# Patient Record
Sex: MALE | Employment: FULL TIME | URBAN - METROPOLITAN AREA
[De-identification: names, ages, dates, MRNs, and addresses within clinical notes are randomized per-mention and may not be internally consistent; named-entity substitution may affect disease eponyms.]

---

## 2022-05-02 ENCOUNTER — OFFICE VISIT (OUTPATIENT)
Dept: FAMILY MEDICINE CLINIC | Facility: CLINIC | Age: 42
End: 2022-05-02
Payer: COMMERCIAL

## 2022-05-02 VITALS
BODY MASS INDEX: 31.64 KG/M2 | DIASTOLIC BLOOD PRESSURE: 75 MMHG | HEART RATE: 52 BPM | WEIGHT: 221 LBS | HEIGHT: 70 IN | SYSTOLIC BLOOD PRESSURE: 134 MMHG

## 2022-05-02 DIAGNOSIS — Z00.00 ROUTINE PHYSICAL EXAMINATION: Primary | ICD-10-CM

## 2022-05-02 DIAGNOSIS — N46.9 MALE INFERTILITY: ICD-10-CM

## 2022-05-02 DIAGNOSIS — E80.6 HYPERBILIRUBINEMIA: ICD-10-CM

## 2022-05-02 DIAGNOSIS — K92.1 HEMATOCHEZIA: ICD-10-CM

## 2022-05-02 DIAGNOSIS — G56.03 BILATERAL CARPAL TUNNEL SYNDROME: ICD-10-CM

## 2022-05-02 PROCEDURE — 3008F BODY MASS INDEX DOCD: CPT | Performed by: FAMILY MEDICINE

## 2022-05-02 PROCEDURE — 3078F DIAST BP <80 MM HG: CPT | Performed by: FAMILY MEDICINE

## 2022-05-02 PROCEDURE — 3075F SYST BP GE 130 - 139MM HG: CPT | Performed by: FAMILY MEDICINE

## 2022-05-02 PROCEDURE — 99385 PREV VISIT NEW AGE 18-39: CPT | Performed by: FAMILY MEDICINE

## 2022-05-02 RX ORDER — HYDROCODONE BITARTRATE AND ACETAMINOPHEN 5; 325 MG/1; MG/1
TABLET ORAL
COMMUNITY

## 2022-05-02 RX ORDER — CLINDAMYCIN HYDROCHLORIDE 300 MG/1
CAPSULE ORAL
COMMUNITY

## 2022-05-02 RX ORDER — KETOROLAC TROMETHAMINE 10 MG/1
TABLET, FILM COATED ORAL
COMMUNITY

## 2022-05-06 ENCOUNTER — LAB ENCOUNTER (OUTPATIENT)
Dept: LAB | Age: 42
End: 2022-05-06
Attending: FAMILY MEDICINE
Payer: COMMERCIAL

## 2022-05-06 ENCOUNTER — PATIENT MESSAGE (OUTPATIENT)
Dept: FAMILY MEDICINE CLINIC | Facility: CLINIC | Age: 42
End: 2022-05-06

## 2022-05-06 ENCOUNTER — LAB ENCOUNTER (OUTPATIENT)
Dept: LAB | Facility: HOSPITAL | Age: 42
End: 2022-05-06
Attending: FAMILY MEDICINE
Payer: COMMERCIAL

## 2022-05-06 DIAGNOSIS — E80.6 HYPERBILIRUBINEMIA: ICD-10-CM

## 2022-05-06 DIAGNOSIS — N46.9 MALE INFERTILITY: ICD-10-CM

## 2022-05-06 DIAGNOSIS — Z00.00 ROUTINE PHYSICAL EXAMINATION: ICD-10-CM

## 2022-05-06 LAB
ALBUMIN SERPL-MCNC: 4.4 G/DL (ref 3.4–5)
ALP LIVER SERPL-CCNC: 67 U/L
ALT SERPL-CCNC: 36 U/L
AST SERPL-CCNC: 26 U/L (ref 15–37)
BILIRUB DIRECT SERPL-MCNC: 0.4 MG/DL (ref 0–0.2)
BILIRUB SERPL-MCNC: 1.8 MG/DL (ref 0.1–2)
CHOLEST SERPL-MCNC: 206 MG/DL (ref ?–200)
FASTING PATIENT GLUCOSE ANSWER: YES
FASTING PATIENT LIPID ANSWER: YES
GLUCOSE BLD-MCNC: 93 MG/DL (ref 70–99)
HDLC SERPL-MCNC: 63 MG/DL (ref 40–59)
LDLC SERPL CALC-MCNC: 129 MG/DL (ref ?–100)
NONHDLC SERPL-MCNC: 143 MG/DL (ref ?–130)
PH SMN: 7.6 [PH] (ref 7.2–8.3)
PROT SERPL-MCNC: 7.6 G/DL (ref 6.4–8.2)
SPECIMEN VOL SMN: 3.5 ML (ref 1.5–6)
SPERM # SMN: 199.6 MILL/ML (ref 15–150)
SPERM IMMOTILE NFR SMN: 5 %
SPERM IMMOTILE NFR SMN: 51 %
SPERM PROG NFR SMN: 44 %
TRIGL SERPL-MCNC: 76 MG/DL (ref 30–149)
TSI SER-ACNC: 1.49 MIU/ML (ref 0.36–3.74)
VLDLC SERPL CALC-MCNC: 14 MG/DL (ref 0–30)

## 2022-05-06 PROCEDURE — 84443 ASSAY THYROID STIM HORMONE: CPT

## 2022-05-06 PROCEDURE — 82947 ASSAY GLUCOSE BLOOD QUANT: CPT

## 2022-05-06 PROCEDURE — 36415 COLL VENOUS BLD VENIPUNCTURE: CPT

## 2022-05-06 PROCEDURE — 80061 LIPID PANEL: CPT

## 2022-05-06 PROCEDURE — 80076 HEPATIC FUNCTION PANEL: CPT

## 2022-05-06 PROCEDURE — 89320 SEMEN ANAL VOL/COUNT/MOT: CPT

## 2022-05-06 NOTE — TELEPHONE ENCOUNTER
From: Tanya Eller. Camilla Day DO  To: Katy Baeza  Sent: 5/6/2022 4:09 PM CDT  Subject: Results          Cholesterol elevated weight loss advised. Semen analysis looks good.

## 2022-07-18 ENCOUNTER — OFFICE VISIT (OUTPATIENT)
Dept: SURGERY | Facility: CLINIC | Age: 42
End: 2022-07-18
Payer: COMMERCIAL

## 2022-07-18 VITALS — HEART RATE: 111 BPM | DIASTOLIC BLOOD PRESSURE: 67 MMHG | SYSTOLIC BLOOD PRESSURE: 103 MMHG

## 2022-07-18 DIAGNOSIS — N46.9 MALE INFERTILITY: Primary | ICD-10-CM

## 2022-08-05 ENCOUNTER — LAB ENCOUNTER (OUTPATIENT)
Dept: LAB | Facility: HOSPITAL | Age: 42
End: 2022-08-05
Attending: UROLOGY
Payer: COMMERCIAL

## 2022-08-05 DIAGNOSIS — N46.9 MALE INFERTILITY: ICD-10-CM

## 2022-08-05 LAB
PH SMN: 7.2 [PH] (ref 7.2–8.3)
SPECIMEN VOL SMN: 4 ML (ref 1.5–6)
SPERM # SMN: 118.6 MILL/ML (ref 15–150)
SPERM IMMOTILE NFR SMN: 48 %
SPERM IMMOTILE NFR SMN: 6 %
SPERM PROG NFR SMN: 46 %
VISC SMN QL: NORMAL

## 2022-08-05 PROCEDURE — 89320 SEMEN ANAL VOL/COUNT/MOT: CPT

## 2024-04-28 ENCOUNTER — APPOINTMENT (OUTPATIENT)
Dept: GENERAL RADIOLOGY | Facility: HOSPITAL | Age: 44
End: 2024-04-28
Attending: EMERGENCY MEDICINE
Payer: COMMERCIAL

## 2024-04-28 ENCOUNTER — HOSPITAL ENCOUNTER (EMERGENCY)
Facility: HOSPITAL | Age: 44
Discharge: HOME | End: 2024-04-28
Attending: EMERGENCY MEDICINE
Payer: COMMERCIAL

## 2024-04-28 VITALS
DIASTOLIC BLOOD PRESSURE: 77 MMHG | OXYGEN SATURATION: 95 % | BODY MASS INDEX: 30.78 KG/M2 | WEIGHT: 215 LBS | HEART RATE: 66 BPM | HEIGHT: 70 IN | SYSTOLIC BLOOD PRESSURE: 96 MMHG | RESPIRATION RATE: 22 BRPM | TEMPERATURE: 98 F

## 2024-04-28 DIAGNOSIS — R00.1 BRADYCARDIA: ICD-10-CM

## 2024-04-28 DIAGNOSIS — R94.31 ABNORMAL EKG: ICD-10-CM

## 2024-04-28 DIAGNOSIS — R07.9 CHEST PAIN, UNSPECIFIED TYPE: Primary | ICD-10-CM

## 2024-04-28 LAB
ANION GAP SERPL CALC-SCNC: 10 MMOL/L (ref 0–18)
ATRIAL RATE: 79 BPM
BASOPHILS # BLD AUTO: 0.04 X10(3) UL (ref 0–0.2)
BASOPHILS NFR BLD AUTO: 0.6 %
BUN BLD-MCNC: 16 MG/DL (ref 9–23)
BUN/CREAT SERPL: 16.8 (ref 10–20)
CALCIUM BLD-MCNC: 10.3 MG/DL (ref 8.7–10.4)
CHLORIDE SERPL-SCNC: 107 MMOL/L (ref 98–112)
CO2 SERPL-SCNC: 23 MMOL/L (ref 21–32)
CREAT BLD-MCNC: 0.95 MG/DL
DEPRECATED RDW RBC AUTO: 40.4 FL (ref 35.1–46.3)
EGFRCR SERPLBLD CKD-EPI 2021: 103 ML/MIN/1.73M2 (ref 60–?)
EOSINOPHIL # BLD AUTO: 0.05 X10(3) UL (ref 0–0.7)
EOSINOPHIL NFR BLD AUTO: 0.8 %
ERYTHROCYTE [DISTWIDTH] IN BLOOD BY AUTOMATED COUNT: 12.2 % (ref 11–15)
GLUCOSE BLD-MCNC: 120 MG/DL (ref 70–99)
GLUCOSE BLDC GLUCOMTR-MCNC: 107 MG/DL (ref 70–99)
HCT VFR BLD AUTO: 47.9 %
HGB BLD-MCNC: 16.8 G/DL
IMM GRANULOCYTES # BLD AUTO: 0.04 X10(3) UL (ref 0–1)
IMM GRANULOCYTES NFR BLD: 0.6 %
LYMPHOCYTES # BLD AUTO: 2.7 X10(3) UL (ref 1–4)
LYMPHOCYTES NFR BLD AUTO: 42.3 %
MAGNESIUM SERPL-MCNC: 1.4 MG/DL (ref 1.6–2.6)
MCH RBC QN AUTO: 31.7 PG (ref 26–34)
MCHC RBC AUTO-ENTMCNC: 35.1 G/DL (ref 31–37)
MCV RBC AUTO: 90.4 FL
MONOCYTES # BLD AUTO: 0.36 X10(3) UL (ref 0.1–1)
MONOCYTES NFR BLD AUTO: 5.6 %
NEUTROPHILS # BLD AUTO: 3.19 X10 (3) UL (ref 1.5–7.7)
NEUTROPHILS # BLD AUTO: 3.19 X10(3) UL (ref 1.5–7.7)
NEUTROPHILS NFR BLD AUTO: 50.1 %
OSMOLALITY SERPL CALC.SUM OF ELEC: 292 MOSM/KG (ref 275–295)
P AXIS: 70 DEGREES
P-R INTERVAL: 166 MS
PLATELET # BLD AUTO: 212 10(3)UL (ref 150–450)
POTASSIUM SERPL-SCNC: 3.8 MMOL/L (ref 3.5–5.1)
Q-T INTERVAL: 388 MS
QRS DURATION: 80 MS
QTC CALCULATION (BEZET): 444 MS
R AXIS: 65 DEGREES
RBC # BLD AUTO: 5.3 X10(6)UL
SODIUM SERPL-SCNC: 140 MMOL/L (ref 136–145)
T AXIS: 43 DEGREES
TROPONIN I SERPL HS-MCNC: 3 NG/L
VENTRICULAR RATE: 79 BPM
WBC # BLD AUTO: 6.4 X10(3) UL (ref 4–11)

## 2024-04-28 PROCEDURE — 71046 X-RAY EXAM CHEST 2 VIEWS: CPT | Performed by: EMERGENCY MEDICINE

## 2024-04-28 PROCEDURE — 82962 GLUCOSE BLOOD TEST: CPT

## 2024-04-28 PROCEDURE — 93010 ELECTROCARDIOGRAM REPORT: CPT

## 2024-04-28 PROCEDURE — 99285 EMERGENCY DEPT VISIT HI MDM: CPT

## 2024-04-28 PROCEDURE — 85025 COMPLETE CBC W/AUTO DIFF WBC: CPT | Performed by: EMERGENCY MEDICINE

## 2024-04-28 PROCEDURE — 83735 ASSAY OF MAGNESIUM: CPT | Performed by: EMERGENCY MEDICINE

## 2024-04-28 PROCEDURE — 96361 HYDRATE IV INFUSION ADD-ON: CPT

## 2024-04-28 PROCEDURE — 84484 ASSAY OF TROPONIN QUANT: CPT | Performed by: EMERGENCY MEDICINE

## 2024-04-28 PROCEDURE — 80048 BASIC METABOLIC PNL TOTAL CA: CPT | Performed by: EMERGENCY MEDICINE

## 2024-04-28 PROCEDURE — 84443 ASSAY THYROID STIM HORMONE: CPT

## 2024-04-28 PROCEDURE — 93005 ELECTROCARDIOGRAM TRACING: CPT

## 2024-04-28 PROCEDURE — 80061 LIPID PANEL: CPT

## 2024-04-28 PROCEDURE — 96365 THER/PROPH/DIAG IV INF INIT: CPT

## 2024-04-28 RX ORDER — MAGNESIUM SULFATE 1 G/100ML
1 INJECTION INTRAVENOUS ONCE
Status: COMPLETED | OUTPATIENT
Start: 2024-04-28 | End: 2024-04-28

## 2024-04-28 NOTE — ED PROVIDER NOTES
Patient Seen in: [facility]         EMERGENCY DEPARTMENT NOTE    Dictated. Voice Transcription software has been utilized for this dictation (the reader should be aware that typographical errors are possible with voice transcription software and to please contact the dictating physician if there are questions.)         History     Chief Complaint   Patient presents with    Numbness    Anxiety/Panic attack              There may be discrepancies from triage note.     HPI    History provided by patient and patient's wife.  43-year-old male who denies any medical problems complaining of 4 years of intermittent low heart rate noted to be in the 30s.  States that he noted his heart rate in the 30s secondary to the use of the Apple Watch.  Has not followed with primary/cardiology regarding the aforementioned.  States that he had an isolated episode of chest pain on Friday which self subsided.  Has not had recurrent chest pain or shortness of breath since then.  Today is Sunday.  Reports great exercise tolerance.  Reports working out almost daily.  Patient comes to the emergency department today because he had an isolated episode of chest pain and  noted his heart rate to be in the 30s once again  in the last 24 hours.  Reports feeling anxious about the aforementioned.   and asking him about the triage note of total body numbness.  Wife states that patient was hyperventilating in the car secondary to feeling nervous.  Patient admits to the aforementioned.  Currently denies any diffuse body paresthesias.    No tobacco, no drugs.  Patient is adopted,  does not know history of sudden cardiac death      No fevers, chills, nausea, vomiting, diarrhea, constipation, cough, cold symptoms, urinary complaints.  No pleuritic pain, no recent travel.  Age < 50, HR< 100, o2 saturation  in room greater than 95%, no previous history of venous thromboembolism, no trauma/surgery within the last 4 weeks, no hemoptysis, no exogenous  estrogen, no unilateral leg swellling. Perc score of 0  No headache, neck pain, neck stiffness, incontinence.  No changes in mentation, no changes in vision, no total/new extremity weakness, no total/new extremity paresthesia, no difficulty speaking.  No alleviating or exacerbating factors.  Denies orthopnea, pnd, change in exercise tolerance limited by chest pain/sob , lower extremity edema/asymmetry.         History reviewed. History reviewed. No pertinent past medical history.    History reviewed.   Past Surgical History:   Procedure Laterality Date    Knee surgery      left knee ACL/MCL/MENISCUS    Repair biceps long tendon      Repair of nasal septum           Medications :  (Not in a hospital admission)       Family History   Family history unknown: Yes       Smoking Status:   Social History     Socioeconomic History    Marital status:    Tobacco Use    Smoking status: Never    Smokeless tobacco: Never   Vaping Use    Vaping status: Never Used   Substance and Sexual Activity    Alcohol use: Yes     Comment: socially    Drug use: Not Currently       Review of Systems   Constitutional: Negative.    HENT: Negative.     Eyes: Negative.    Respiratory: Negative.     Cardiovascular: Negative.    Gastrointestinal: Negative.    Genitourinary: Negative.    Musculoskeletal: Negative.    Skin: Negative.    Neurological: Negative.    Endo/Heme/Allergies: Negative.    Psychiatric/Behavioral: Negative.     All other systems reviewed and are negative.    Pertinent positives as listed.  All other organ systems are reviewed and are negative.    Constitutional and vital signs reviewed.      Social History and Family History elements reviewed from today, pertinent positives to the presenting problem noted.    Physical Exam     ED Triage Vitals [04/28/24 1251]   /68   Pulse 79   Resp (!) 30   Temp 98.3 °F (36.8 °C)   Temp src Temporal   SpO2 98 %   O2 Device None (Room air)       All measures to prevent infection  transmission during my interaction with the patient were taken. The patient was already wearing a droplet mask on my arrival to the room. Personal protective equipment including droplet mask, eye protection, and gloves were worn throughout the duration of the exam.  Handwashing was performed prior to and after the exam.  Stethoscope and any equipment used during my examination was cleaned with super sani-cloth germicidal wipes following the exam.     Physical Exam  Vitals and nursing note reviewed.   Constitutional:       General: He is not in acute distress.     Appearance: He is not ill-appearing or toxic-appearing.   HENT:      Head: Normocephalic and atraumatic.   Neck:      Vascular: No carotid bruit.   Cardiovascular:      Rate and Rhythm: Normal rate and regular rhythm.      Comments:   Dorsalis pedis pulses 2+ bilaterally    Pulmonary:      Effort: Pulmonary effort is normal. No respiratory distress.      Breath sounds: No stridor. No wheezing, rhonchi or rales.   Chest:      Chest wall: No tenderness.   Abdominal:      General: There is no distension.      Palpations: Abdomen is soft.      Tenderness: There is no abdominal tenderness. There is no guarding or rebound.   Musculoskeletal:      Cervical back: Normal range of motion and neck supple. No tenderness.      Right lower leg: No edema.      Left lower leg: No edema.   Skin:     Capillary Refill: Capillary refill takes less than 2 seconds.      Coloration: Skin is not jaundiced or pale.      Findings: No bruising, erythema, lesion or rash.   Neurological:      Mental Status: He is alert and oriented to person, place, and time.      Sensory: No sensory deficit.      Motor: No weakness.      Comments: Gross motor and sensory function intact symmetrically and bilaterally to upper extremities and lower extremities.   Psychiatric:      Comments: Anxious           Review of prior notes in Care everywhere/Epic performed by myself:  -No recent ER visits  ED  Course     If labs obtained, they are personally reviewed by myself:     Labs Reviewed   BASIC METABOLIC PANEL (8) - Abnormal; Notable for the following components:       Result Value    Glucose 120 (*)     All other components within normal limits   MAGNESIUM - Abnormal; Notable for the following components:    Magnesium 1.4 (*)     All other components within normal limits   POCT GLUCOSE - Abnormal; Notable for the following components:    POC Glucose  107 (*)     All other components within normal limits   TROPONIN I HIGH SENSITIVITY - Normal   CBC WITH DIFFERENTIAL WITH PLATELET    Narrative:     The following orders were created for panel order CBC With Differential With Platelet.                  Procedure                               Abnormality         Status                                     ---------                               -----------         ------                                     CBC W/ DIFFERENTIAL[[id number]]                              Final result                                                 Please view results for these tests on the individual orders.   CBC W/ DIFFERENTIAL       If radiologic studies ordered during today's ER visit, my independent interpretation are seen directly below.  This is awaiting the radiologist's final interpretation.  Chest X-ray, independent interpretation completed by myself and awaiting formal radiology read:  No acute cardiopulmonary process, no obvious mass       Imaging Results read by radiology in ED: XR CHEST PA + LAT CHEST (CPT=71046)    Result Date: 4/28/2024  CONCLUSION: No acute cardiopulmonary disease.    Dictated by (CST): Marcus Das MD on 4/28/2024 at 2:04 PM     Finalized by (CST): Marcus Das MD on 4/28/2024 at 2:05 PM               ED Medications Administered:   Medications   magnesium sulfate in dextrose 5% 1 g/100mL infusion premix 1 g (1 g Intravenous New Bag 4/28/24 1423)   sodium chloride 0.9 % IV bolus 1,000 mL (1,000 mL  Intravenous New Bag 4/28/24 1326)           Vitals:    04/28/24 1336 04/28/24 1351 04/28/24 1406 04/28/24 1421   BP:  121/72 125/64 123/72   Pulse: 65 64 64 72   Resp: 11 12 16    Temp:       TempSrc:       SpO2: 99% 97% 96% 93%   Weight:       Height:         *I personally reviewed and interpreted all ED vitals.    Pulse Ox interpretation by myself: 98%, Room air, Normal     Monitor Interpretation by myself:   normal sinus rhythm    If Ekg obtained during today's visit, it is independently interpreted by myself directly below:  EKG    Rate, intervals and axes as noted on EKG Report.  Rate: 79  Rhythm: Sinus Rhythm  Reading: no st elevation, no st depression, normal t waves   Sinus arrhythmia             Medical Record Review: I personally reviewed available prior medical records for any recent pertinent discharge summaries, testing, and procedures and reviewed those reports.      [facility]     Medical decision making/ED Course:   43-year-old male who exercises regularly and has a an athletic physique, complaining of heart rates dipping down to the 30s noted on his Apple Watch over the course of years.  States that this reoccurred x 1 over the course of 24 hours.  No current chest pain, shortness of breath, patient had an isolated episode of chest pain on Friday.  Today is Sunday.  Extremely anxious during interview.  Unclear etiology of patient's bradycardia however patient was observed here in the emergency department with no episodes of sinus bradycardia/dysrhythmia.  Troponin negative, EKG sinus-if this were true ACS, would anticipate a troponin elevation given duration of symptoms and the use of the high-sensitivity troponin.  Chest x-ray with normal-sized mediastinum, normal cardiac size.  Patient with no exertional symptoms to suggest acute decompensated CHF.  Electrolytes essentially normal sodium, potassium, chloride normal.  Hemoglobin normal.  Magnesium level normal.  Magnesium slightly low at 1.4, patient  given 1 g of magnesium.  Patient encouraged to follow with cardiology as an outpatient.  Encouraged to return to the emergency department for any worsening symptoms.  No pleuritic pain, hypoxia, lower extremity asymmetry distress PE.  Patient's Accu-Chek upon arrival was normal.  No episodes of bradycardia here in the emergency department.  Chest x-ray normal.  Patient encouraged to follow with cardiology as an outpatient.  He will likely benefit from Zio patch.  Patient is very clear to mention that his heart rate has dropped to the 30s for several years.  I doubt an acute life-threatening pathology.  Denies lightheadedness at this time  ACS, dissection, PE, cardiac arrhythmia, pericardial effusion, CHF, among other life-threatening medical conditions considered and seems unlikely given patient's history, exam, and appearance.  Strict return instructions given.  Patient encouraged to follow-up with primary care provider in the next few days.  Advised to return to the emergency department for any worsening symptoms      Patient is non toxic appearing, is in no distress, hemodynamically stable.  Pt agrees and is aware of plan.   Patient is comfortable with discharge.      Differential Diagnosis:  as listed above in medical decision making.   *Please note that in the presenting to the emergency department, illness/injury that poses a threat to life or function is considered during this patient's initial evaluation.    The complexity of this visit is therefore inherently more complex given the need to consider life threatening pathology prior to any other etiology for this patient's visit.    The differential diagnosis and medical decision above exemplify this rationale.       Medical Decision Making  Problems Addressed:  Chest pain, unspecified type: acute illness or injury    Amount and/or Complexity of Data Reviewed  Independent Historian: spouse  External Data Reviewed: notes.  Labs: ordered. Decision-making  details documented in ED Course.  Radiology: ordered and independent interpretation performed. Decision-making details documented in ED Course.  ECG/medicine tests: ordered and independent interpretation performed. Decision-making details documented in ED Course.               Vitals:    04/28/24 1336 04/28/24 1351 04/28/24 1406 04/28/24 1421   BP:  121/72 125/64 123/72   Pulse: 65 64 64 72   Resp: 11 12 16    Temp:       TempSrc:       SpO2: 99% 97% 96% 93%   Weight:       Height:                 Complicating Factors: Significant medical problems that contribute to the complexity of this emergency room evaluation is listed above.    Condition upon leaving the department: Stable    Disposition and Plan     Clinical Impression:  1. Chest pain, unspecified type        Disposition:  Discharge    Medications Prescribed:  Current Discharge Medication List          I have discussed the discharge plan with the patient and/or family or well wisher present in the room with the patient's permission.  They state that they understand and agree with the plan.  All questions regarding their care have been answered prior to discharge.  They are aware: Emergency Department is not intended to be a substitute for an effort to provide complete medical care. The imaging, if any, have often been interpreted on a preliminary basis pending final reading by the radiologist.  Instructed to return immediately to the ED if any changes or worsening of condition should occur.  If patient's blood pressure was greater than 140/90 today, patient encouraged to have this blood pressure rechecked with primary MD and blood pressure education provided.

## 2024-04-28 NOTE — ED INITIAL ASSESSMENT (HPI)
Pt ambulatory to ED A&O x 4 w/ c/o: \"I think I'm having a heart attack.  I am numb everywhere.\"  Pt denies any current chest pain.  Pt reporting he's had instances where his HR drops to the \"30s.\"  Pt reporting he checked his Apple watch and it told him his HR was in the \"30s.\"  Pt arrives extremely anxious, hyperventilating, restless.

## 2024-04-28 NOTE — DISCHARGE INSTRUCTIONS
Return to emergency department for any worsening symptoms including but not limited to worsening palpitations, chest pain, shortness of breath, worsening symptoms with exertion, lower extremity swelling, weakness, numbness, abdominal pain, etc. Please follow with your primary care provider in the next few days for reevaluation of your symptoms.  Please follow with cardiology regarding your palpitations.  You may benefit from a Zio patch/Holter monitor to evaluate your cardiac rhythm.      The Emergency Department is not intended to be a substitute for an effort to provide complete medical care. The imaging, if any, have often been interpreted on a preliminary basis pending final reading by the radiologist. If your blood pressure was greater than 140/90, please have this blood pressure rechecked by your primary care provider in the next several days.

## 2024-05-02 ENCOUNTER — OFFICE VISIT (OUTPATIENT)
Dept: FAMILY MEDICINE CLINIC | Facility: CLINIC | Age: 44
End: 2024-05-02
Payer: COMMERCIAL

## 2024-05-02 ENCOUNTER — EKG ENCOUNTER (OUTPATIENT)
Dept: LAB | Age: 44
End: 2024-05-02
Attending: FAMILY MEDICINE
Payer: COMMERCIAL

## 2024-05-02 ENCOUNTER — HOSPITAL ENCOUNTER (OUTPATIENT)
Dept: ULTRASOUND IMAGING | Age: 44
Discharge: HOME | End: 2024-05-02
Attending: FAMILY MEDICINE
Payer: COMMERCIAL

## 2024-05-02 ENCOUNTER — LAB ENCOUNTER (OUTPATIENT)
Dept: LAB | Age: 44
End: 2024-05-02
Attending: FAMILY MEDICINE
Payer: COMMERCIAL

## 2024-05-02 ENCOUNTER — PATIENT OUTREACH (OUTPATIENT)
Dept: CASE MANAGEMENT | Age: 44
End: 2024-05-02

## 2024-05-02 VITALS
DIASTOLIC BLOOD PRESSURE: 80 MMHG | HEART RATE: 51 BPM | WEIGHT: 215 LBS | SYSTOLIC BLOOD PRESSURE: 136 MMHG | BODY MASS INDEX: 30.78 KG/M2 | HEIGHT: 70 IN

## 2024-05-02 DIAGNOSIS — R00.1 BRADYCARDIA: ICD-10-CM

## 2024-05-02 DIAGNOSIS — R09.89 ABDOMINAL AORTIC PULSATION: ICD-10-CM

## 2024-05-02 DIAGNOSIS — R94.31 ABNORMAL EKG: ICD-10-CM

## 2024-05-02 DIAGNOSIS — R94.31 ABNORMAL EKG: Primary | ICD-10-CM

## 2024-05-02 LAB
ATRIAL RATE: 66 BPM
CHOLEST SERPL-MCNC: 212 MG/DL (ref ?–200)
HDLC SERPL-MCNC: 67 MG/DL (ref 40–59)
LDLC SERPL CALC-MCNC: 120 MG/DL (ref ?–100)
NONHDLC SERPL-MCNC: 145 MG/DL (ref ?–130)
P AXIS: 77 DEGREES
P-R INTERVAL: 138 MS
Q-T INTERVAL: 440 MS
QRS DURATION: 94 MS
QTC CALCULATION (BEZET): 461 MS
R AXIS: 47 DEGREES
T AXIS: 24 DEGREES
TRIGL SERPL-MCNC: 145 MG/DL (ref 30–149)
TSI SER-ACNC: 1.91 MIU/ML (ref 0.55–4.78)
VENTRICULAR RATE: 66 BPM
VLDLC SERPL CALC-MCNC: 25 MG/DL (ref 0–30)

## 2024-05-02 PROCEDURE — 3008F BODY MASS INDEX DOCD: CPT | Performed by: FAMILY MEDICINE

## 2024-05-02 PROCEDURE — 3075F SYST BP GE 130 - 139MM HG: CPT | Performed by: FAMILY MEDICINE

## 2024-05-02 PROCEDURE — 99214 OFFICE O/P EST MOD 30 MIN: CPT | Performed by: FAMILY MEDICINE

## 2024-05-02 PROCEDURE — 93010 ELECTROCARDIOGRAM REPORT: CPT | Performed by: INTERNAL MEDICINE

## 2024-05-02 PROCEDURE — 3079F DIAST BP 80-89 MM HG: CPT | Performed by: FAMILY MEDICINE

## 2024-05-02 PROCEDURE — 76770 US EXAM ABDO BACK WALL COMP: CPT | Performed by: FAMILY MEDICINE

## 2024-05-02 PROCEDURE — 93005 ELECTROCARDIOGRAM TRACING: CPT

## 2024-05-02 RX ORDER — ESCITALOPRAM OXALATE 10 MG/1
10 TABLET ORAL DAILY
Qty: 30 TABLET | Refills: 2 | Status: SHIPPED | OUTPATIENT
Start: 2024-05-02

## 2024-05-02 NOTE — PROGRESS NOTES
1st attempt ER f/up apt request  PCP -existing apt (5/2)  [facility] -decline, pt stated he will make own apt  Closing enocunter

## 2024-05-02 NOTE — PROGRESS NOTES
Blood pressure 136/80, pulse 51, height 5' 10\" (1.778 m), weight 215 lb (97.5 kg).          2-month history of noticing his heart rate monitor saying his pulses in his 30s while he is resting.  He had an episode of chest pain 4 days ago went to the emergency room.  At the time the pain was substernal dull ache radiating to the right shoulder and the back    Severity was 2-3/10.  He reports he was sitting when the pain began.  No history of syncope.  He did get difficulty breathing is that he was breaking out.  Cut out caffeine 2 weeks ago.  Denies chest pain or dyspnea on exertion.  He does 25 to 30 minutes of high intensive cardio work in the gym.  He will run 3 to 4 miles when it is warm out.  He is adopted and no known siblings.  He drinks about 8 drinks of alcohol weekly on the weekends.  No history of DUIs.  Denies daily medications Works as a teacher.  Denies melena/hematochezia or asthma.  In the emergency department he was told his pulse went down into the 40s.  He is also concerned about the pulsating aorta in his abdomen    He denies illicit drug use.  Denies any suicidal ideation.  Admits to being anxious.  Objective    Heart regular rate rhythm no murmurs    Neck supple no carotid bruits    Pulsating aorta noted abdomen no bruits    Abnormal EKG done in the emergency room.    Pulse in office is 51    Assessment #1 bradycardia #2 abnormal EKG #3 anxiety panic    4.  Pulsatile aorta    Plan #148-hour Holter monitor ordered repeat EKG and echo #2 stress echo ordered #3 start Lexapro follow-up in 3 weeks #4 abdominal ultrasound of aorta ordered

## 2024-05-11 ENCOUNTER — HOSPITAL ENCOUNTER (OUTPATIENT)
Dept: CV DIAGNOSTICS | Facility: HOSPITAL | Age: 44
Discharge: HOME | End: 2024-05-11
Attending: FAMILY MEDICINE

## 2024-05-11 DIAGNOSIS — R94.31 ABNORMAL EKG: ICD-10-CM

## 2024-05-11 DIAGNOSIS — R00.1 BRADYCARDIA: ICD-10-CM

## 2024-05-11 PROCEDURE — 93225 XTRNL ECG REC<48 HRS REC: CPT | Performed by: FAMILY MEDICINE

## 2024-05-11 PROCEDURE — 93350 STRESS TTE ONLY: CPT | Performed by: FAMILY MEDICINE

## 2024-05-11 PROCEDURE — 93306 TTE W/DOPPLER COMPLETE: CPT | Performed by: FAMILY MEDICINE

## 2024-05-11 PROCEDURE — 93226 XTRNL ECG REC<48 HR SCAN A/R: CPT | Performed by: FAMILY MEDICINE

## 2024-05-11 PROCEDURE — 93017 CV STRESS TEST TRACING ONLY: CPT | Performed by: FAMILY MEDICINE

## 2024-05-11 PROCEDURE — 93227 XTRNL ECG REC<48 HR R&I: CPT | Performed by: FAMILY MEDICINE

## 2024-05-11 PROCEDURE — 93018 CV STRESS TEST I&R ONLY: CPT | Performed by: FAMILY MEDICINE

## 2024-05-14 ENCOUNTER — TELEPHONE (OUTPATIENT)
Dept: FAMILY MEDICINE CLINIC | Facility: CLINIC | Age: 44
End: 2024-05-14

## 2024-05-14 NOTE — TELEPHONE ENCOUNTER
Called and spoke to STORM majano in  [facility] triage that patient would be coming by car. See previous message

## 2024-05-14 NOTE — TELEPHONE ENCOUNTER
Katherine with [facility] states patient's holter monitor showed a total of 87 runs of ventricular tachycardia.   The highest rate was 194.  Katherine will be faxing report to office for review however she states 194 is a critical value and was reporting finding.

## 2024-05-14 NOTE — PROGRESS NOTES
Critical findings of on 48 hour holter monitor report. 29, 3 beat runs of v tach. The fastest was 3 beats, 194 bpm @ 2:24 pm. Called  office and spoke with Debbi (STORM) and advised her of findings. Faxed a copy of holter report to dr office and put copy in [employer] reading bin.

## 2024-05-14 NOTE — TELEPHONE ENCOUNTER
Please advise patient that his heart has occasionally entered dangerous arrhythmias.  He needs to go to the emergency department immediately cardiology has been informed.

## 2024-05-15 ENCOUNTER — MED REC SCAN ONLY (OUTPATIENT)
Dept: FAMILY MEDICINE CLINIC | Facility: CLINIC | Age: 44
End: 2024-05-15

## 2024-05-16 ENCOUNTER — HOSPITAL ENCOUNTER (EMERGENCY)
Facility: HOSPITAL | Age: 44
Discharge: HOME | End: 2024-05-16
Attending: EMERGENCY MEDICINE

## 2024-05-16 ENCOUNTER — PATIENT MESSAGE (OUTPATIENT)
Dept: FAMILY MEDICINE CLINIC | Facility: CLINIC | Age: 44
End: 2024-05-16

## 2024-05-16 VITALS
HEART RATE: 54 BPM | WEIGHT: 214.94 LBS | TEMPERATURE: 98 F | DIASTOLIC BLOOD PRESSURE: 88 MMHG | SYSTOLIC BLOOD PRESSURE: 142 MMHG | OXYGEN SATURATION: 99 % | RESPIRATION RATE: 11 BRPM | BODY MASS INDEX: 31 KG/M2

## 2024-05-16 DIAGNOSIS — R94.31 ABNORMAL EKG: ICD-10-CM

## 2024-05-16 DIAGNOSIS — R00.1 BRADYCARDIA: ICD-10-CM

## 2024-05-16 DIAGNOSIS — I47.20 VENTRICULAR TACHYCARDIA (HCC): Primary | ICD-10-CM

## 2024-05-16 LAB
ANION GAP SERPL CALC-SCNC: 6 MMOL/L (ref 0–18)
BASOPHILS # BLD AUTO: 0.03 X10(3) UL (ref 0–0.2)
BASOPHILS NFR BLD AUTO: 0.5 %
BNP SERPL-MCNC: 11 PG/ML
BUN BLD-MCNC: 13 MG/DL (ref 9–23)
BUN/CREAT SERPL: 14.8 (ref 10–20)
CALCIUM BLD-MCNC: 9.1 MG/DL (ref 8.7–10.4)
CHLORIDE SERPL-SCNC: 105 MMOL/L (ref 98–112)
CO2 SERPL-SCNC: 28 MMOL/L (ref 21–32)
CREAT BLD-MCNC: 0.88 MG/DL
DEPRECATED RDW RBC AUTO: 39.8 FL (ref 35.1–46.3)
EGFRCR SERPLBLD CKD-EPI 2021: 111 ML/MIN/1.73M2 (ref 60–?)
EOSINOPHIL # BLD AUTO: 0.17 X10(3) UL (ref 0–0.7)
EOSINOPHIL NFR BLD AUTO: 3.1 %
ERYTHROCYTE [DISTWIDTH] IN BLOOD BY AUTOMATED COUNT: 12 % (ref 11–15)
GLUCOSE BLD-MCNC: 105 MG/DL (ref 70–99)
HCT VFR BLD AUTO: 47 %
HGB BLD-MCNC: 16.3 G/DL
IMM GRANULOCYTES # BLD AUTO: 0.01 X10(3) UL (ref 0–1)
IMM GRANULOCYTES NFR BLD: 0.2 %
LYMPHOCYTES # BLD AUTO: 2.14 X10(3) UL (ref 1–4)
LYMPHOCYTES NFR BLD AUTO: 39.1 %
MAGNESIUM SERPL-MCNC: 1.9 MG/DL (ref 1.6–2.6)
MCH RBC QN AUTO: 31.4 PG (ref 26–34)
MCHC RBC AUTO-ENTMCNC: 34.7 G/DL (ref 31–37)
MCV RBC AUTO: 90.6 FL
MONOCYTES # BLD AUTO: 0.47 X10(3) UL (ref 0.1–1)
MONOCYTES NFR BLD AUTO: 8.6 %
NEUTROPHILS # BLD AUTO: 2.66 X10 (3) UL (ref 1.5–7.7)
NEUTROPHILS # BLD AUTO: 2.66 X10(3) UL (ref 1.5–7.7)
NEUTROPHILS NFR BLD AUTO: 48.5 %
OSMOLALITY SERPL CALC.SUM OF ELEC: 288 MOSM/KG (ref 275–295)
PLATELET # BLD AUTO: 208 10(3)UL (ref 150–450)
POTASSIUM SERPL-SCNC: 3.8 MMOL/L (ref 3.5–5.1)
RBC # BLD AUTO: 5.19 X10(6)UL
SODIUM SERPL-SCNC: 139 MMOL/L (ref 136–145)
TROPONIN I SERPL HS-MCNC: 4 NG/L
TSI SER-ACNC: 1.9 MIU/ML (ref 0.55–4.78)
WBC # BLD AUTO: 5.5 X10(3) UL (ref 4–11)

## 2024-05-16 PROCEDURE — 85025 COMPLETE CBC W/AUTO DIFF WBC: CPT | Performed by: EMERGENCY MEDICINE

## 2024-05-16 PROCEDURE — 84484 ASSAY OF TROPONIN QUANT: CPT | Performed by: EMERGENCY MEDICINE

## 2024-05-16 PROCEDURE — 36415 COLL VENOUS BLD VENIPUNCTURE: CPT

## 2024-05-16 PROCEDURE — 99285 EMERGENCY DEPT VISIT HI MDM: CPT

## 2024-05-16 PROCEDURE — 93010 ELECTROCARDIOGRAM REPORT: CPT

## 2024-05-16 PROCEDURE — 83735 ASSAY OF MAGNESIUM: CPT | Performed by: EMERGENCY MEDICINE

## 2024-05-16 PROCEDURE — 83880 ASSAY OF NATRIURETIC PEPTIDE: CPT | Performed by: EMERGENCY MEDICINE

## 2024-05-16 PROCEDURE — 80048 BASIC METABOLIC PNL TOTAL CA: CPT | Performed by: EMERGENCY MEDICINE

## 2024-05-16 PROCEDURE — 84443 ASSAY THYROID STIM HORMONE: CPT | Performed by: EMERGENCY MEDICINE

## 2024-05-16 PROCEDURE — 99284 EMERGENCY DEPT VISIT MOD MDM: CPT

## 2024-05-16 PROCEDURE — 93005 ELECTROCARDIOGRAM TRACING: CPT

## 2024-05-16 NOTE — ED PROVIDER NOTES
Patient Seen in: [facility] Emergency Department    History     Chief Complaint   Patient presents with    Arrythmia/Palpitations       HPI    43-year-old male presents ER with complaint of abnormal Holter monitor resolved.  Patient states he was called by primary care physician told him to go to the ER immediately.  Patient with a longstanding history of bradycardia.  Patient denies any chest pain or shortness of breath.  Patient denies any symptoms.    Holter monitor findings from 5/11/2024 report 3 beats of V. tach    History reviewed. History reviewed. No pertinent past medical history.    History reviewed.   Past Surgical History:   Procedure Laterality Date    Knee surgery      left knee ACL/MCL/MENISCUS    Repair biceps long tendon      Repair of nasal septum           Medications :  (Not in a hospital admission)       Family History   Family history unknown: Yes       Smoking Status:   Social History     Socioeconomic History    Marital status:    Tobacco Use    Smoking status: Never    Smokeless tobacco: Never   Vaping Use    Vaping status: Never Used   Substance and Sexual Activity    Alcohol use: Yes     Comment: socially    Drug use: Not Currently       ROS  All pertinent positives for the review of systems are mentioned in the HPI  All other organ systems are reviewed and are negative.    Constitutional and vital signs reviewed.      Social History and Family History elements reviewed from today, pertinent positives to the presenting problem noted.    Physical Exam     ED Triage Vitals [05/16/24 1228]   /88   Pulse 56   Resp 22   Temp 97.8 °F (36.6 °C)   Temp src Temporal   SpO2 99 %   O2 Device None (Room air)       All measures to prevent infection transmission during my interaction with the patient were taken. The patient was already wearing a droplet mask on my arrival to the room. Personal protective equipment including droplet mask, eye protection, and gloves were worn  throughout the duration of the exam.  Handwashing was performed prior to and after the exam.  Stethoscope and any equipment used during my examination was cleaned with super sani-cloth germicidal wipes following the exam.     Physical Exam  Vitals and nursing note reviewed.   Constitutional:       Appearance: He is well-developed.   HENT:      Head: Normocephalic and atraumatic.      Right Ear: External ear normal.      Left Ear: External ear normal.      Nose: Nose normal.   Eyes:      Conjunctiva/sclera: Conjunctivae normal.      Pupils: Pupils are equal, round, and reactive to light.   Cardiovascular:      Rate and Rhythm: Regular rhythm. Bradycardia present.      Heart sounds: Normal heart sounds.   Pulmonary:      Effort: Pulmonary effort is normal.      Breath sounds: Normal breath sounds.   Abdominal:      General: Bowel sounds are normal.      Palpations: Abdomen is soft.   Musculoskeletal:         General: Normal range of motion.      Cervical back: Normal range of motion and neck supple.   Skin:     General: Skin is warm and dry.   Neurological:      General: No focal deficit present.      Mental Status: He is alert and oriented to person, place, and time.      Cranial Nerves: No cranial nerve deficit.      Sensory: No sensory deficit.      Motor: No weakness.      Coordination: Coordination normal.      Deep Tendon Reflexes: Reflexes are normal and symmetric.   Psychiatric:         Behavior: Behavior normal.         Thought Content: Thought content normal.         Judgment: Judgment normal.         ED Course        Labs Reviewed   BASIC METABOLIC PANEL (8) - Abnormal; Notable for the following components:       Result Value    Glucose 105 (*)     All other components within normal limits   MAGNESIUM - Normal   CBC WITH DIFFERENTIAL WITH PLATELET    Narrative:     The following orders were created for panel order CBC With Differential With Platelet.                  Procedure                                Abnormality         Status                                     ---------                               -----------         ------                                     CBC W/ DIFFERENTIAL[[id number]]                              Final result                                                 Please view results for these tests on the individual orders.   BNP (B TYPE NATRIURETIC PEPTIDE)   TSH W REFLEX TO FREE T4   TROPONIN I HIGH SENSITIVITY   CBC W/ DIFFERENTIAL     EKG    Rate, intervals and axes as noted on EKG Report.  Rate: 55  Rhythm: Sinus Rhythm  Reading: No ST deviation, normal axis.             Imaging Results Available and Reviewed while in ED: No results found.  ED Medications Administered: Medications - No data to display      MDM     Vitals:    05/16/24 1228   BP: 155/88   Pulse: 56   Resp: 22   Temp: 97.8 °F (36.6 °C)   TempSrc: Temporal   SpO2: 99%   Weight: 97.5 kg     *I personally reviewed and interpreted all ED vitals.  I also personally reviewed all labs and imaging if ordered    Pulse Ox: 99%, Room air, Normal     Monitor Interpretation:   sinus bradycardia    Differential Diagnosis/ Diagnostic Considerations: Sinus bradycardia, electrolyte abnormality,    Medical Record Review: I personally reviewed available prior medical records for any recent pertinent discharge summaries, testing, and procedures and reviewed those reports.    Complicating Factors: The patient already has does not have any pertinent problems on file. to contribute to the complexity of this ED evaluation.    Medical Decision Making  43-year-old male presents ER with complaint of abnormal Holter monitor results.  Patient is asymptomatic this time.  Patient with history of bradycardia.  Discussed with [facility] nurse practitioner states that she looked up the Holter monitor results the patient had multiple episodes of V. tach in 48 hours.  [facility] cardiology recommends patient be admitted for further evaluation.   Hospitalist notified of the admission as well.    Problems Addressed:  Bradycardia: chronic illness or injury  Ventricular tachycardia (HCC): acute illness or injury    Amount and/or Complexity of Data Reviewed  External Data Reviewed: notes.     Details: Holter monitor results reviewed.    Critical findings of on 48 hour holter monitor report. 29, 3 beat runs of v tach. The fastest was 3 beats, 194 bpm @ 2:24 pm.   Labs: ordered. Decision-making details documented in ED Course.        Condition upon leaving the department: Stable    Disposition and Plan     Clinical Impression:  1. Ventricular tachycardia (HCC)    2. Bradycardia        Disposition:  Admit    Follow-up:  No follow-up provider specified.    Medications Prescribed:  Current Discharge Medication List          Hospital Problems       Present on Admission  Date Reviewed: 5/2/2024            ICD-10-CM Noted POA    * (Principal) Ventricular tachycardia (HCC) I47.20 5/16/2024 Unknown

## 2024-05-16 NOTE — TELEPHONE ENCOUNTER
Called Patient. Verified name and date of birth.  Patient was informed of Holter monitor results and recommendations to go to the ER.  Patient verbalized understanding and will go to [facility] today.

## 2024-05-16 NOTE — ED INITIAL ASSESSMENT (HPI)
Pt states sent to ED after abnormal readings on Holter monitor last week. Pt states he was wearing monitor due to bradycardia. Pt denies chest pain or sob. No respiratory distress noted. Pt is alert and oriented x4. Pt skin parameters WNL.

## 2024-05-16 NOTE — ED PROVIDER NOTES
Patient's care received in signout from Dr. Blanchard.  He was evaluated by Dr. Pryor with cardiology who reviewed Holter monitor results and feels patient is stable for discharge home, outpatient follow-up without any changes in treatment.

## 2024-05-16 NOTE — ED QUICK NOTES
Orders for admission, patient is aware of plan and ready to go upstairs. Any questions, please call ED RN pj at extension [phone].     Patient Covid vaccination status: Unvaccinated     COVID Test Ordered in ED: None    COVID Suspicion at Admission: N/A    Running Infusions:  None    Mental Status/LOC at time of transport: axox4    Other pertinent information:   CIWA score: N/A   NIH score:  N/A

## 2024-05-16 NOTE — CONSULTS
[facility] - CARDIOLOGY CONSULT NOTE    Wan Melendrez Patient Status:  Emergency    1980 MRN [id number]   Location [facility] EMERGENCY DEPARTMENT Attending Claudy Blanchard DO   Hosp Day # 0 PCP Sathya Ayon DO     Date of Admission:  2024  Date of Consult:  2024  I was asked by Claudy Blanchard DO to provide recommendations for evaluation and management of cardiac issues.  Impression:     PVCs  Idiopathic PVCs occasional couplets and triplets  Probably asymptomatic  Echo and stress echo normal  Baseline EKG normal  Holter monitor with 5.5% PVCs  ER lab workup negative    Bradycardia  Asymptomatic sinus bradycardia mostly nocturnal  Heart rate minimum 37 at 4 AM maximum 103 beats a minute averaging 59 beats a minute    Palpitations  Rare palpitations and occasional sensation of not catching his breath at rest  No exertional symptoms with exercise  No syncope    Recommendations:    Discharge from the emergency room without changes in treatment or therapy  Follow-up myself office visit     Reason for Consultation:     Abnormal monitor and PVCs    History of Present Illness:   Patient is a 43 year old male who was admitted to the hospital for call for abnormal monitor with PVCs couplets and triplets.    Patient with a primary's office and has below history and had an echo and stress echo that were normal occasional PVCs on the stress.  Had a Holter monitor with 5.5% PVCs and occasional couplets and triplets and called to go to the emergency room to get it checked out because of Holter criteria.    Patient denies syncope no exertional symptoms.  Occasional difficulty catching breath or sensation of fullness in the chest at rest that goes away and is fleeting for seconds.  Rare palpitation but nothing sound like an arrhythmia or tachycardia.  Incidentally noticed a heart rate in the 30s on his watch couple times and was concerned regarding this so got the initial workup  as described below.    2-month history of noticing his heart rate monitor saying his pulses in his 30s while he is resting.  He had an episode of chest pain 4 days ago went to the emergency room.  At the time the pain was substernal dull ache radiating to the right shoulder and the back     Severity was 2-3/10.  He reports he was sitting when the pain began.  No history of syncope.  He did get difficulty breathing is that he was breaking out.  Cut out caffeine 2 weeks ago.  Denies chest pain or dyspnea on exertion.  He does 25 to 30 minutes of high intensive cardio work in the gym.  He will run 3 to 4 miles when it is warm out.  He is adopted and no known siblings.  He drinks about 8 drinks of alcohol weekly on the weekends.  No history of DUIs.  Denies daily medications Works as a teacher.  Denies melena/hematochezia or asthma.  In the emergency department he was told his pulse went down into the 40s.  He is also concerned about the pulsating aorta in his abdomen     He denies illicit drug use.  Denies any suicidal ideation.  Admits to being anxious.  Cardiac tests:  EKG normal sinus rhythm  Holter monitor heart rates ranging from  beats a minute averaging 59 beats a minute.  5.5% monomorphic PVCs.  Occasional couplets and triplets.  Stress echo occasional PVCs normal EKG and stress echo imaging normal stress echo  Echocardiogram normal  AAA ultrasound normal aorta  Troponin negative    Results:     Lab Results   Component Value Date    WBC 5.5 05/16/2024    HGB 16.3 05/16/2024    HCT 47.0 05/16/2024    .0 05/16/2024    CREATSERUM 0.88 05/16/2024    BUN 13 05/16/2024     05/16/2024    K 3.8 05/16/2024     05/16/2024    CO2 28.0 05/16/2024     (H) 05/16/2024    CA 9.1 05/16/2024    ALB 4.4 05/06/2022    ALKPHO 67 05/06/2022    BILT 1.8 05/06/2022    TP 7.6 05/06/2022    AST 26 05/06/2022    ALT 36 05/06/2022    TSH 1.895 05/16/2024    MG 1.9 05/16/2024       No results found.  EKG 12  Lead    Result Date: 5/16/2024  Sinus bradycardia Otherwise normal ECG When compared with ECG of 02-MAY-2024 10:11, Premature ventricular complexes are no longer Present     Past Medical History  History reviewed. No pertinent past medical history.    Past Surgical History  Past Surgical History:   Procedure Laterality Date    Knee surgery      left knee ACL/MCL/MENISCUS    Repair biceps long tendon      Repair of nasal septum         Family History  Family History   Family history unknown: Yes       Social History  Pediatric History   Patient Parents    Not on file     Other Topics Concern    Not on file   Social History Narrative    Not on file           Current Medications:  No current facility-administered medications for this encounter.     (Not in a hospital admission)      Allergies  Allergies   Allergen Reactions    Penicillins OTHER (SEE COMMENTS)     UNKNOWN  UNKNOWN         Review of Systems:   10 pt ROS performed, separate from HPI  Review of Systems:  GENERAL: no fevers, chills, sweats  HEENT: no visual or hearing changes  SKIN: denies any unusual skin lesions or rashes  RESPIRATORY: denies shortness of breath with exertion  CARDIOVASCULAR: no active chest pain, no claudication  GI: denies abdominal pain and denies heartburn  : no dysuria or hematuria  NEURO: denies headaches, focal weaknesses or paresthesias  All other systems reviewed and negative.  fatigue is present  All other systems were reviewed are negative  Physical Exam:   Blood pressure 155/88, pulse 56, temperature 97.8 °F (36.6 °C), temperature source Temporal, resp. rate 22, weight 214 lb 15.2 oz (97.5 kg), SpO2 99%.    Scheduled Meds:       Physical Exam:    General: Alert and oriented. No apparent distress. No respiratory or constitutional distress.  HEENT: Normocephalic, anicteric sclera, neck supple  Neck: No JVD, carotids 2+, supple  Cardiac: Regular rate. No pathologic murmur.  Lungs: Clear with normal effort.  Normal excursions  and effort.  Abdomen: Soft, non-tender. BS-present.  Extremities: Without clubbing, cyanosis.  Peripheral pulsespresent.  Neurologic: Alert and oriented, normal affect. Motor ok.  Skin: Warm and dry.     Imaging: I independently visualized all relevant chest imaging in PACS, agree with radiology interpretation except where noted.  Thank you for allowing me to participate in the care of your patient.    Jerry Pryor MD  [facility] [place]  Cardiac Electrophysiology  5/16/2024  5 level consult

## 2024-05-17 LAB
ATRIAL RATE: 55 BPM
P AXIS: 63 DEGREES
P-R INTERVAL: 166 MS
Q-T INTERVAL: 446 MS
QRS DURATION: 92 MS
QTC CALCULATION (BEZET): 426 MS
R AXIS: 53 DEGREES
T AXIS: 35 DEGREES
VENTRICULAR RATE: 55 BPM

## 2024-05-17 NOTE — TELEPHONE ENCOUNTER
From: Wan Melendrez  To: Sathya Ayon  Sent: 5/16/2024 5:39 PM CDT  Subject: [facility]     Dr. Gabo Guallpa had me go to the ER and they just said I need to follow up with the cardiologist. Who do I contact and do I need a referral?

## 2024-05-21 ENCOUNTER — PATIENT OUTREACH (OUTPATIENT)
Dept: CASE MANAGEMENT | Age: 44
End: 2024-05-21

## 2024-05-21 NOTE — PROGRESS NOTES
1st attempt ER f/up apt request  PCP -existing apt (5/23)  [facility] -existing apt (5/21)  Closing encounter

## 2024-05-23 ENCOUNTER — TELEPHONE (OUTPATIENT)
Dept: FAMILY MEDICINE CLINIC | Facility: CLINIC | Age: 44
End: 2024-05-23

## 2024-05-23 ENCOUNTER — OFFICE VISIT (OUTPATIENT)
Dept: FAMILY MEDICINE CLINIC | Facility: CLINIC | Age: 44
End: 2024-05-23

## 2024-05-23 VITALS
DIASTOLIC BLOOD PRESSURE: 66 MMHG | HEIGHT: 70 IN | HEART RATE: 54 BPM | WEIGHT: 217 LBS | BODY MASS INDEX: 31.07 KG/M2 | SYSTOLIC BLOOD PRESSURE: 113 MMHG

## 2024-05-23 DIAGNOSIS — I47.20 VENTRICULAR TACHYCARDIA (HCC): ICD-10-CM

## 2024-05-23 DIAGNOSIS — R00.1 BRADYCARDIA: Primary | ICD-10-CM

## 2024-05-23 PROCEDURE — 99212 OFFICE O/P EST SF 10 MIN: CPT | Performed by: FAMILY MEDICINE

## 2024-05-23 PROCEDURE — 3078F DIAST BP <80 MM HG: CPT | Performed by: FAMILY MEDICINE

## 2024-05-23 PROCEDURE — 3074F SYST BP LT 130 MM HG: CPT | Performed by: FAMILY MEDICINE

## 2024-05-23 PROCEDURE — 3008F BODY MASS INDEX DOCD: CPT | Performed by: FAMILY MEDICINE

## 2024-05-23 NOTE — PROGRESS NOTES
Blood pressure 113/66, pulse 54, height 5' 10\" (1.778 m), weight 217 lb (98.4 kg).          Patient presents today following up for visit to the emergency department for ventricular tachycardia.  Has seen cardiology in consultation he feels well.  Has not had any chest pain or dyspnea.  Reports that he also has a bulge in the middle of his stomach.  Otherwise feels well.    Objective comfortable no apparent distress    Assessment history of nonsustained V. tach with diastases recti seen on exam    Plan information given about diastases recti will obtain consultation letter from cardiology

## 2024-07-09 ENCOUNTER — OFFICE VISIT (OUTPATIENT)
Dept: FAMILY MEDICINE CLINIC | Facility: CLINIC | Age: 44
End: 2024-07-09
Payer: COMMERCIAL

## 2024-07-09 VITALS
HEART RATE: 63 BPM | HEIGHT: 70 IN | RESPIRATION RATE: 17 BRPM | BODY MASS INDEX: 30.92 KG/M2 | SYSTOLIC BLOOD PRESSURE: 133 MMHG | WEIGHT: 216 LBS | DIASTOLIC BLOOD PRESSURE: 80 MMHG

## 2024-07-09 DIAGNOSIS — K21.9 GASTROESOPHAGEAL REFLUX DISEASE, UNSPECIFIED WHETHER ESOPHAGITIS PRESENT: ICD-10-CM

## 2024-07-09 DIAGNOSIS — R00.2 PALPITATIONS: Primary | ICD-10-CM

## 2024-07-09 DIAGNOSIS — F41.1 GAD (GENERALIZED ANXIETY DISORDER): ICD-10-CM

## 2024-07-09 PROCEDURE — 3075F SYST BP GE 130 - 139MM HG: CPT | Performed by: FAMILY MEDICINE

## 2024-07-09 PROCEDURE — 99214 OFFICE O/P EST MOD 30 MIN: CPT | Performed by: FAMILY MEDICINE

## 2024-07-09 PROCEDURE — 3079F DIAST BP 80-89 MM HG: CPT | Performed by: FAMILY MEDICINE

## 2024-07-09 PROCEDURE — 3008F BODY MASS INDEX DOCD: CPT | Performed by: FAMILY MEDICINE

## 2024-07-09 RX ORDER — ESCITALOPRAM OXALATE 10 MG/1
10 TABLET ORAL DAILY
Qty: 30 TABLET | Refills: 2 | Status: SHIPPED | OUTPATIENT
Start: 2024-07-09

## 2024-07-09 RX ORDER — OMEPRAZOLE 20 MG/1
20 CAPSULE, DELAYED RELEASE ORAL
Qty: 90 CAPSULE | Refills: 1 | Status: SHIPPED | OUTPATIENT
Start: 2024-07-09

## 2024-07-09 NOTE — PROGRESS NOTES
Blood pressure 133/80, pulse 63, resp. rate 17, height 5' 10\" (1.778 m), weight 216 lb (98 kg).          Patient presents today complaining of intermittent palpitations and dizziness.  He continues to exercise.  Denies chest pain or dyspnea.  He reports that he feels very anxious about his symptoms.  He does not do drugs and denies any suicidality.  He does not miss work and he is productive.    Objective  Appropriate mood and affect  Heart regular rate rhythm no murmurs    Neck supple no carotid bruits    Assessment intermittent palpitations and dizziness with anxiety    Plan EKG ordered for today referral to cardiology and event monitor order entered    Start Lexapro follow-up with me in 3 weeks    GO TO ER IF SYMPTOMS PERSIST OR WORSEN.

## 2024-09-17 ENCOUNTER — OFFICE VISIT (OUTPATIENT)
Facility: CLINIC | Age: 44
End: 2024-09-17
Payer: COMMERCIAL

## 2024-09-17 ENCOUNTER — TELEPHONE (OUTPATIENT)
Facility: CLINIC | Age: 44
End: 2024-09-17

## 2024-09-17 VITALS
HEART RATE: 50 BPM | WEIGHT: 211.88 LBS | SYSTOLIC BLOOD PRESSURE: 129 MMHG | DIASTOLIC BLOOD PRESSURE: 72 MMHG | HEIGHT: 70 IN | BODY MASS INDEX: 30.33 KG/M2

## 2024-09-17 DIAGNOSIS — R05.9 COUGH, UNSPECIFIED TYPE: ICD-10-CM

## 2024-09-17 DIAGNOSIS — R13.10 DYSPHAGIA, UNSPECIFIED TYPE: ICD-10-CM

## 2024-09-17 DIAGNOSIS — K21.9 GASTROESOPHAGEAL REFLUX DISEASE, UNSPECIFIED WHETHER ESOPHAGITIS PRESENT: Primary | ICD-10-CM

## 2024-09-17 PROCEDURE — 3008F BODY MASS INDEX DOCD: CPT | Performed by: STUDENT IN AN ORGANIZED HEALTH CARE EDUCATION/TRAINING PROGRAM

## 2024-09-17 PROCEDURE — 3074F SYST BP LT 130 MM HG: CPT | Performed by: STUDENT IN AN ORGANIZED HEALTH CARE EDUCATION/TRAINING PROGRAM

## 2024-09-17 PROCEDURE — 99204 OFFICE O/P NEW MOD 45 MIN: CPT | Performed by: STUDENT IN AN ORGANIZED HEALTH CARE EDUCATION/TRAINING PROGRAM

## 2024-09-17 PROCEDURE — 3078F DIAST BP <80 MM HG: CPT | Performed by: STUDENT IN AN ORGANIZED HEALTH CARE EDUCATION/TRAINING PROGRAM

## 2024-09-17 NOTE — TELEPHONE ENCOUNTER
Scheduled for:  EGD 31930  Provider Name:  Dr. Maurice  Date:  10/4/24  Location:  [facility]   Sedation:  MAC  Time:  (pt is aware that ENDO will call the day before to confirm arrival time)    Prep:  NPO after Midnight  Meds/Allergies Reconciled?:  Physician Reviewed  Diagnosis with codes:  GERD k21.9, Dysphagia R13.10, cough R05.9  Was patient informed to call insurance with codes (Y/N):  Yes  Referral sent?:  Referral was sent at the time of electronic surgical scheduling.  EM or [facility] notified?:  I sent an electronic request to Endo Scheduling and received a confirmation today.  Medication Orders:  Pt is aware to NOT take iron pills, herbal meds and diet supplements for 7 days before exam. Also to NOT take any form of alcohol, recreational drugs and any forms of ED meds 24 hours before exam.   Misc Orders:  Patient was informed that they will need a COVID 19 test prior to their procedure. Patient verbally understood & will await a phone call from [facility] to schedule.       Further instructions given by staff:  I provide prep instructions to patient at the time of the appointment and reviewed date, time and location, he verbalized that he understood and is aware to call if he has any questions.

## 2024-09-17 NOTE — PATIENT INSTRUCTIONS
1. Schedule upper endoscopy (EGD) with MAC [Diagnosis: GERD, dysphagia, cough]    2. If you start any NEW medication after your visit today, please notify us. Certain medications will need to be held before the procedure, or the procedure cannot be performed.     3. Consider Pepcid 40mg in the future

## 2024-09-17 NOTE — H&P
[facility] - Gastroenterology                                                                                                               Reason for consult: heartburn, epigastric fullness    Requesting physician or provider: Sathya Ayon,     HPI:   Wan Melendrez is a 44 year old year-old man who presents to GI clinic with complaint of heartburn discuss an EGD.    Patient has had longstanding symptoms of intermittent heartburn and food regurgitation.  However, his symptoms typically go away after several days and then do not bother him for quite some time.  He has occasionally taken Nexium in the past and more recently omeprazole on and off.    In the meantime, at the same time as the symptoms he noticed palpitations.  He has been evaluated by cardiology and had a Holter monitor for this.  Patient had PVCs.  He had an echo and stress test that were normal.  No further evaluation was needed from a cardiology standpoint.    Given ongoing issues of intermittent fullness in the epigastric region as well as heartburn he was sent to GI to discuss PPI therapy versus EGD.  The patient did recently take omeprazole and felt that this made his heart rate faster and therefore he stopped.    Denies dysphagia or vomiting.  No family history of esophageal or gastric cancer.  No family history of colon cancer.    Denies new medications or supplements.    Prior endoscopies:  none    Soc:  -no smoking  -yes Etoh    Wt Readings from Last 6 Encounters:   09/17/24 211 lb 14.4 oz (96.1 kg)   07/09/24 216 lb (98 kg)   05/23/24 217 lb (98.4 kg)   05/16/24 214 lb 15.2 oz (97.5 kg)   05/02/24 215 lb (97.5 kg)   04/28/24 215 lb (97.5 kg)        History, Medications, Allergies, ROS:      History reviewed. No pertinent past medical history.   Past Surgical History:   Procedure Laterality Date    Knee surgery      left knee  ACL/MCL/MENISCUS    Repair biceps long tendon      Repair of nasal septum        Family Hx:   Family History   Family history unknown: Yes      Social History:   Social History     Socioeconomic History    Marital status:    Tobacco Use    Smoking status: Never    Smokeless tobacco: Never   Vaping Use    Vaping status: Never Used   Substance and Sexual Activity    Alcohol use: Yes     Alcohol/week: 10.0 standard drinks of alcohol     Types: 10 Standard drinks or equivalent per week     Comment: socially    Drug use: Never        Medications (Active prior to today's visit):  Current Outpatient Medications   Medication Sig Dispense Refill    omeprazole 20 MG Oral Capsule Delayed Release Take 1 capsule (20 mg total) by mouth every morning before breakfast. (For stomach acid) 90 capsule 1    escitalopram 10 MG Oral Tab Take 1 tablet (10 mg total) by mouth daily. 30 tablet 2       Allergies:  Allergies   Allergen Reactions    Penicillins OTHER (SEE COMMENTS)     UNKNOWN  UNKNOWN         ROS:   CONSTITUTIONAL:  negative for fevers, rigors  EYES:  negative for diplopia   RESPIRATORY:  negative for severe shortness of breath  CARDIOVASCULAR:  negative for crushing sub-sternal chest pain  GASTROINTESTINAL:  see HPI  GENITOURINARY:  negative for dysuria or gross hematuria  INTEGUMENT/BREAST:  SKIN:  negative for jaundice   ALLERGIC/IMMUNOLOGIC:  negative for hay fever  ENDOCRINE:  negative for cold intolerance and heat intolerance  MUSCULOSKELETAL:  negative for joint effusion/severe erythema  BEHAVIOR/PSYCH:  negative for psychotic behavior      PHYSICAL EXAM:   Blood pressure 129/72, pulse 50, height 5' 10\" (1.778 m), weight 211 lb 14.4 oz (96.1 kg).    Gen- Patient appears comfortable and in no acute discomfort  HEENT: the sclera appears anicteric, oropharynx clear, mucus membranes appear moist  CV- regular rate and rhythm, the extremities are warm and well perfused   Lung- Moves air well; No labored  breathing  Abdomen- soft, non-tender exam in all quadrants without rigidity or guarding, non-distended  Skin- No jaundice  Ext: no edema is evident.   Neuro- Alert and interactive, and gross movements of extremities normal  Psych - appropriate, non-agitated    Labs/Imaging:     Patient's pertinent labs and imaging were reviewed and discussed with patient today.      .  ASSESSMENT/PLAN:   Wan Melendrez is a 44 year old year-old man who presents to GI clinic with complaint of heartburn discuss an EGD.    #Epigastric fullness  #Heartburn/food regurgitation  The patient has symptoms of epigastric burning/fullness as well as heartburn that is not very responsive to PPI therapy though he did not take for a prolonged period of time.  He has had years of symptoms on and off Nexium.  He consumes alcohol but is not obese and does not smoke cigarettes.  I do have low suspicion for Hwang's esophagus.  However, given intermittent and prolonged symptoms we will perform an EGD to rule out Hwang's esophagus and assess the gastric mucosa for gastric ulcerations and take biopsies to rule out H. pylori.    We did discuss lifestyle modifications including elevating the head of the bed at night, not eating close to bedtime and avoiding certain foods that trigger symptoms.    Recommendations:  1. Schedule upper endoscopy (EGD) with [facility] [Diagnosis: GERD, dysphagia, cough]    2. If you start any NEW medication after your visit today, please notify us. Certain medications will need to be held before the procedure, or the procedure cannot be performed.     3. Consider Pepcid 40mg in the future or PPI therapy pending EGD findings.    Orders This Visit:  No orders of the defined types were placed in this encounter.      Meds This Visit:  Requested Prescriptions      No prescriptions requested or ordered in this encounter     Imaging & Referrals:  None     Jesus Maurice MD  [facility]  9/17/2024      This note  was partially prepared using Dragon Medical voice recognition dictation software. As a result, errors may occur. When identified, these errors have been corrected. While every attempt is made to correct errors during dictation, discrepancies may still exist.

## 2024-09-27 ENCOUNTER — TELEPHONE (OUTPATIENT)
Facility: CLINIC | Age: 44
End: 2024-09-27

## 2024-09-27 NOTE — TELEPHONE ENCOUNTER
Patient states that he called his insurance to find out if a Prior Authorization is required for 10/4/24 Esophagogastroduodenoscopy.  He states that he was informed that it is the offices responsibility to process Prior Authorization.

## 2024-09-27 NOTE — TELEPHONE ENCOUNTER
Spoke to patient    Let him know we received authorization for his upcoming procedure    Patient verbalized understanding and had no further questions

## 2024-10-04 ENCOUNTER — HOSPITAL ENCOUNTER (OUTPATIENT)
Facility: HOSPITAL | Age: 44
Setting detail: HOSPITAL OUTPATIENT SURGERY
Discharge: HOME | End: 2024-10-04
Attending: STUDENT IN AN ORGANIZED HEALTH CARE EDUCATION/TRAINING PROGRAM | Admitting: STUDENT IN AN ORGANIZED HEALTH CARE EDUCATION/TRAINING PROGRAM
Payer: COMMERCIAL

## 2024-10-04 ENCOUNTER — ANESTHESIA (OUTPATIENT)
Dept: ENDOSCOPY | Facility: HOSPITAL | Age: 44
End: 2024-10-04
Payer: COMMERCIAL

## 2024-10-04 ENCOUNTER — ANESTHESIA EVENT (OUTPATIENT)
Dept: ENDOSCOPY | Facility: HOSPITAL | Age: 44
End: 2024-10-04
Payer: COMMERCIAL

## 2024-10-04 VITALS
RESPIRATION RATE: 11 BRPM | DIASTOLIC BLOOD PRESSURE: 86 MMHG | SYSTOLIC BLOOD PRESSURE: 117 MMHG | BODY MASS INDEX: 30.06 KG/M2 | WEIGHT: 210 LBS | HEIGHT: 70 IN | HEART RATE: 52 BPM | OXYGEN SATURATION: 97 %

## 2024-10-04 DIAGNOSIS — R05.9 COUGH, UNSPECIFIED TYPE: ICD-10-CM

## 2024-10-04 DIAGNOSIS — K21.9 GASTROESOPHAGEAL REFLUX DISEASE, UNSPECIFIED WHETHER ESOPHAGITIS PRESENT: ICD-10-CM

## 2024-10-04 DIAGNOSIS — R13.10 DYSPHAGIA, UNSPECIFIED TYPE: ICD-10-CM

## 2024-10-04 PROCEDURE — 0DB38ZX EXCISION OF LOWER ESOPHAGUS, VIA NATURAL OR ARTIFICIAL OPENING ENDOSCOPIC, DIAGNOSTIC: ICD-10-PCS | Performed by: STUDENT IN AN ORGANIZED HEALTH CARE EDUCATION/TRAINING PROGRAM

## 2024-10-04 PROCEDURE — 0DB68ZX EXCISION OF STOMACH, VIA NATURAL OR ARTIFICIAL OPENING ENDOSCOPIC, DIAGNOSTIC: ICD-10-PCS | Performed by: STUDENT IN AN ORGANIZED HEALTH CARE EDUCATION/TRAINING PROGRAM

## 2024-10-04 PROCEDURE — 43239 EGD BIOPSY SINGLE/MULTIPLE: CPT | Performed by: STUDENT IN AN ORGANIZED HEALTH CARE EDUCATION/TRAINING PROGRAM

## 2024-10-04 PROCEDURE — 0DB98ZX EXCISION OF DUODENUM, VIA NATURAL OR ARTIFICIAL OPENING ENDOSCOPIC, DIAGNOSTIC: ICD-10-PCS | Performed by: STUDENT IN AN ORGANIZED HEALTH CARE EDUCATION/TRAINING PROGRAM

## 2024-10-04 PROCEDURE — 0DB18ZX EXCISION OF UPPER ESOPHAGUS, VIA NATURAL OR ARTIFICIAL OPENING ENDOSCOPIC, DIAGNOSTIC: ICD-10-PCS | Performed by: STUDENT IN AN ORGANIZED HEALTH CARE EDUCATION/TRAINING PROGRAM

## 2024-10-04 RX ORDER — LIDOCAINE HYDROCHLORIDE 10 MG/ML
INJECTION, SOLUTION EPIDURAL; INFILTRATION; INTRACAUDAL; PERINEURAL AS NEEDED
Status: DISCONTINUED | OUTPATIENT
Start: 2024-10-04 | End: 2024-10-04 | Stop reason: SURG

## 2024-10-04 RX ORDER — GLYCOPYRROLATE 0.2 MG/ML
INJECTION, SOLUTION INTRAMUSCULAR; INTRAVENOUS AS NEEDED
Status: DISCONTINUED | OUTPATIENT
Start: 2024-10-04 | End: 2024-10-04 | Stop reason: SURG

## 2024-10-04 RX ORDER — NALOXONE HYDROCHLORIDE 0.4 MG/ML
0.08 INJECTION, SOLUTION INTRAMUSCULAR; INTRAVENOUS; SUBCUTANEOUS ONCE AS NEEDED
Status: DISCONTINUED | OUTPATIENT
Start: 2024-10-04 | End: 2024-10-04

## 2024-10-04 RX ORDER — SODIUM CHLORIDE, SODIUM LACTATE, POTASSIUM CHLORIDE, CALCIUM CHLORIDE 600; 310; 30; 20 MG/100ML; MG/100ML; MG/100ML; MG/100ML
INJECTION, SOLUTION INTRAVENOUS CONTINUOUS
Status: DISCONTINUED | OUTPATIENT
Start: 2024-10-04 | End: 2024-10-04

## 2024-10-04 RX ADMIN — LIDOCAINE HYDROCHLORIDE 50 MG: 10 INJECTION, SOLUTION EPIDURAL; INFILTRATION; INTRACAUDAL; PERINEURAL at 11:08:00

## 2024-10-04 RX ADMIN — SODIUM CHLORIDE, SODIUM LACTATE, POTASSIUM CHLORIDE, CALCIUM CHLORIDE: 600; 310; 30; 20 INJECTION, SOLUTION INTRAVENOUS at 11:04:00

## 2024-10-04 RX ADMIN — GLYCOPYRROLATE 0.2 MG: 0.2 INJECTION, SOLUTION INTRAMUSCULAR; INTRAVENOUS at 11:06:00

## 2024-10-04 NOTE — ANESTHESIA PREPROCEDURE EVALUATION
Anesthesia PreOp Note    HPI:     Wan Melendrez is a 44 year old male who presents for preoperative consultation requested by: Jesus Maurice MD    Date of Surgery: 10/4/2024    Procedure(s):  ESOPHAGOGASTRODUODENOSCOPY  Indication: Gastroesophageal reflux disease, unspecified whether esophagitis present / Dysphagia, unspecified type/ Cough, unspecified type    Relevant Problems   No relevant active problems       NPO:  Last Liquid Consumption Date: 10/04/24  Last Liquid Consumption Time: 0700  Last Solid Consumption Date: 10/03/24  Last Solid Consumption Time: 0200  Last Liquid Consumption Date: 10/04/24          History Review:  Patient Active Problem List    Diagnosis Date Noted    Ventricular tachycardia (HCC) 05/16/2024       History reviewed. No pertinent past medical history.    Past Surgical History:   Procedure Laterality Date    Knee surgery      left knee ACL/MCL/MENISCUS    Repair biceps long tendon      Repair of nasal septum         Medications Prior to Admission   Medication Sig Dispense Refill Last Dose    omeprazole 20 MG Oral Capsule Delayed Release Take 1 capsule (20 mg total) by mouth every morning before breakfast. (For stomach acid) 90 capsule 1     escitalopram 10 MG Oral Tab Take 1 tablet (10 mg total) by mouth daily. 30 tablet 2      Current Facility-Administered Medications Ordered in Epic   Medication Dose Route Frequency Provider Last Rate Last Admin    lactated ringers infusion   Intravenous Continuous Jesus Maurice MD         No current [facility]-ordered outpatient medications on file.       Allergies   Allergen Reactions    Penicillins OTHER (SEE COMMENTS)     UNKNOWN  UNKNOWN         Family History   Family history unknown: Yes     Social History     Socioeconomic History    Marital status:    Tobacco Use    Smoking status: Never    Smokeless tobacco: Never   Vaping Use    Vaping status: Never Used   Substance and Sexual Activity    Alcohol use: Yes      Alcohol/week: 10.0 standard drinks of alcohol     Types: 10 Standard drinks or equivalent per week     Comment: socially    Drug use: Never       Available pre-op labs reviewed.             Vital Signs:  Body mass index is 30.13 kg/m².   height is 1.778 m (5' 10\") and weight is 95.3 kg (210 lb). His blood pressure is 144/72 and his pulse is 59. His respiration is 12 and oxygen saturation is 100%.   Vitals:    10/02/24 1442 10/04/24 1046   BP:  144/72   Pulse:  59   Resp:  12   SpO2:  100%   Weight: 95.3 kg (210 lb)    Height: 1.778 m (5' 10\")         Anesthesia Evaluation     Patient summary reviewed and Nursing notes reviewed    No history of anesthetic complications   Airway   Mallampati: I  TM distance: >3 FB  Neck ROM: full  Dental - Dentition appears grossly intact     Pulmonary - negative ROS and normal exam   Cardiovascular - negative ROS and normal exam  Exercise tolerance: good    Neuro/Psych - negative ROS     GI/Hepatic/Renal    (+) GERD    Endo/Other - negative ROS   Abdominal  - normal exam                 Anesthesia Plan:   ASA:  1  Plan:   MAC  Informed Consent Plan and Risks Discussed With:  Patient      I have informed Wan YUDELKA Melendrez and/or legal guardian or family member of the nature of the anesthetic plan, benefits, risks including possible dental damage if relevant, major complications, and any alternative forms of anesthetic management.   All of the patient's questions were answered to the best of my ability. The patient desires the anesthetic management as planned.  Neeta Benitez MD  10/4/2024 10:56 AM  Present on Admission:  **None**

## 2024-10-04 NOTE — H&P
History & Physical Examination    Patient Name: Wan Melendrez  MRN: [id number]  [facility]: [id number]  YOB: 1980    Diagnosis: GERD, epigastric fullness, dysphagia.    EGD today with biopsies.    Medications Prior to Admission   Medication Sig Dispense Refill Last Dose    omeprazole 20 MG Oral Capsule Delayed Release Take 1 capsule (20 mg total) by mouth every morning before breakfast. (For stomach acid) 90 capsule 1     escitalopram 10 MG Oral Tab Take 1 tablet (10 mg total) by mouth daily. 30 tablet 2      No current facility-administered medications for this encounter.       Allergies:   Allergies   Allergen Reactions    Penicillins OTHER (SEE COMMENTS)     UNKNOWN  UNKNOWN         History reviewed. No pertinent past medical history.  Past Surgical History:   Procedure Laterality Date    Knee surgery      left knee ACL/MCL/MENISCUS    Repair biceps long tendon      Repair of nasal septum       Family History   Family history unknown: Yes     Social History     Tobacco Use    Smoking status: Never    Smokeless tobacco: Never   Substance Use Topics    Alcohol use: Yes     Alcohol/week: 10.0 standard drinks of alcohol     Types: 10 Standard drinks or equivalent per week     Comment: socially       SYSTEM Check if Review is Normal Check if Physical Exam is Normal If not normal, please explain:   HEENT [X ] [ X]    NECK  [X ] [ X]    HEART [X ] [ X]    LUNGS [X ] [ X]    ABDOMEN [X ] [ X]    EXTREMITIES [X ] [ X]    OTHER        I have discussed the risks and benefits and alternatives of the procedure with the patient/family.  They understand and agree to proceed with plan of care.   I have reviewed the History and Physical done within the last 30 days.  Any changes noted above.    Jesus Maurice MD  [facility]

## 2024-10-04 NOTE — ANESTHESIA POSTPROCEDURE EVALUATION
Patient: Wan Melendrez    Procedure Summary       Date: 10/04/24 Room / Location: [facility] ENDOSCOPY 04 / [facility] ENDOSCOPY    Anesthesia Start: 1104 Anesthesia Stop: 1125    Procedure: ESOPHAGOGASTRODUODENOSCOPY Diagnosis:       Gastroesophageal reflux disease, unspecified whether esophagitis present      Dysphagia, unspecified type      Cough, unspecified type      (gastritis)    Surgeons: Jesus Maurice MD Anesthesiologist: Neeta Benitez MD    Anesthesia Type: MAC ASA Status: 1            Anesthesia Type: MAC    Vitals Value Taken Time   /81 10/04/24 1125   Temp 98 10/04/24 1127   Pulse 65 10/04/24 1127   Resp 13 10/04/24 1127   SpO2 96 % 10/04/24 1127   Vitals shown include unfiled device data.    [facility] AN Post Evaluation:   Patient Evaluated in PACU  Patient Participation: complete - patient participated  Level of Consciousness: awake  Pain Score: 0  Pain Management: adequate  Airway Patency:patent  Dental exam unchanged from preop  Yes    Nausea/Vomiting: none  Cardiovascular Status: acceptable  Respiratory Status: acceptable  Postoperative Hydration acceptable      Neeta Benitez MD  10/4/2024 11:27 AM

## 2024-10-04 NOTE — OPERATIVE REPORT
ESOPHAGOGASTRODUODENOSCOPY (EGD) REPORT    Wan Melendrez     1980 Age 44 year old   PCP Sathya Ayon DO Endoscopist Jesus Maurice MD       Date of procedure: 10/04/24    Procedure: EGD w/ biopsies    Pre-operative diagnosis:  cough, epigastric discomfort, dysphagia    Post-operative diagnosis: see impression    Medications: MAC    Complications: none    Procedure:  Informed consent was obtained from the patient after the risks of the procedure were discussed, including but not limited to bleeding, perforation, aspiration, infection, or possibility of a missed lesion. After discussions of the risks/benefits and alternatives to this procedure, as well as the planned sedation, the patient was placed in the left lateral decubitus position and begun on continuous blood pressure pulse oximetry and EKG monitoring and this was maintained throughout the procedure. Once an adequate level of sedation was obtained a bite block was placed. Then the lubricated tip of the [id number] diagnostic video upper endoscope was inserted and advanced using direct visualization into the posterior pharynx and ultimately into the esophagus. The scope was then advanced through the stomach and to the second portion of the duodenum.    Complications: None    Findings:      1. Esophagus: The squamocolumnar junction was noted at 38 cm and appeared regular. The diaphragmatic pinch was noted noted at 38 cm from the incisors. The esophageal mucosa appeared healthy and normal. There was no endoscopic findings of esophagitis, stricture or Hwang's esophagus. Biopsies were taken in the distal and proximal esophagus with a cold forceps for histology.    2. Stomach: The stomach distended normally. Normal rugal folds were seen. The pylorus was patent. Retroflexion revealed a normal fundus. The gastric mucosa appeared mildly erythematous. Biopsies were taken with a cold forceps from the antrum, incisura and body for histology.      3. Duodenum: The duodenal mucosa appeared normal in the bulb and 2nd portion of the duodenum. Biopsied.    Impression:  -Esophagus: Normal appearing esophagus. Biopsied.  -Stomach: Mild gastritis, no ulcers or other lesions seen. Biopsied.  -Duodenum: Normal examined duodenum. Biopsied.    Recommend:  1. Await pathology.  2. Consider acid suppression.    >>>If tissue was sampled/removed and you have not received your pathology results either by phone or letter within 2 weeks, please call our office at [phone].    Specimens:  Duodenum, gastric, esophagus    Blood loss: <1 ml

## 2024-10-04 NOTE — DISCHARGE INSTRUCTIONS
Home Care Instructions for Gastroscopy with Sedation    Diet:  - Resume your regular diet as tolerated unless otherwise instructed.  - Start with light meals to minimize bloating.  - Do not drink alcohol today.    Medication:  - If you have questions about resuming your normal medications, please contact your Primary Care Physician.    Activities:  - Take it easy today. Do not return to work today.  - Do not drive today.  - Do not operate any machinery today (including kitchen equipment).      Gastroscopy:  - You may have a sore throat for 2-3 days following the exam. This is normal. Gargling with warm salt water (1/2 tsp salt to 1 glass warm water) or using throat lozenges will help.  - If you experience any sharp pain in your neck, abdomen or chest, vomiting of blood, oral temperature over 100 degrees Fahrenheit, light-headedness or dizziness, or any other problems, contact your doctor.    **If unable to reach your doctor, please go to the [facility] Emergency Room**    - Your referring physician will receive a full report of your examination.  - If you do not hear from your doctor's office within two weeks of your biopsy, please call them for your results.    You may be able to see your laboratory results in [employer] between 4 and 7 business days.  In some cases, your physician may not have viewed the results before they are released to [employer].  If you have questions regarding your results contact the physician who ordered the test/exam by phone or via [employer] by choosing \"Ask a Medical Question.\"

## 2024-10-25 ENCOUNTER — OFFICE VISIT (OUTPATIENT)
Dept: FAMILY MEDICINE CLINIC | Facility: CLINIC | Age: 44
End: 2024-10-25
Payer: COMMERCIAL

## 2024-10-25 VITALS
HEIGHT: 70 IN | DIASTOLIC BLOOD PRESSURE: 71 MMHG | RESPIRATION RATE: 18 BRPM | WEIGHT: 217 LBS | TEMPERATURE: 99 F | BODY MASS INDEX: 31.07 KG/M2 | HEART RATE: 55 BPM | SYSTOLIC BLOOD PRESSURE: 122 MMHG

## 2024-10-25 DIAGNOSIS — Z91.09 ENVIRONMENTAL ALLERGIES: Primary | ICD-10-CM

## 2024-10-25 PROCEDURE — 3008F BODY MASS INDEX DOCD: CPT | Performed by: FAMILY MEDICINE

## 2024-10-25 PROCEDURE — 3074F SYST BP LT 130 MM HG: CPT | Performed by: FAMILY MEDICINE

## 2024-10-25 PROCEDURE — 99213 OFFICE O/P EST LOW 20 MIN: CPT | Performed by: FAMILY MEDICINE

## 2024-10-25 PROCEDURE — 3078F DIAST BP <80 MM HG: CPT | Performed by: FAMILY MEDICINE

## 2024-10-25 RX ORDER — FLUTICASONE PROPIONATE 50 MCG
2 SPRAY, SUSPENSION (ML) NASAL DAILY
Qty: 1 EACH | Refills: 3 | Status: SHIPPED | OUTPATIENT
Start: 2024-10-25 | End: 2025-02-22

## 2024-10-25 RX ORDER — ALBUTEROL SULFATE 90 UG/1
2 INHALANT RESPIRATORY (INHALATION) EVERY 6 HOURS PRN
Qty: 1 EACH | Refills: 5 | Status: SHIPPED | OUTPATIENT
Start: 2024-10-25 | End: 2025-10-25

## 2024-10-25 NOTE — PROGRESS NOTES
Blood pressure 122/71, pulse 55, temperature 98.5 °F (36.9 °C), temperature source Temporal, resp. rate 18, height 5' 10\" (1.778 m), weight 217 lb (98.4 kg).          Patient presents today reporting he has had a cough for about a year got worse last month and now has improved.  He had been coughing up yellow to green phlegm and now he is no longer doing that.  He does get nasal congestion he denies any sore throat or ear pain.  No fever no chills.  No difficulty breathing.  Some sneezing no watery or itchy eyes.  He denies heartburn or headache.  No facial pressure no bad breath or tooth pain.  He does not smoke or have asthma.  No marijuana use.  Uses Allegra for allergies.  Has had cats his whole life.    Objective allergic shiners noted on exam    Lungs clear to auscultation no rales rhonchi or wheezes    Throat clear not erythematous    Assessment chronic cough likely allergic    Plan Flonase and albuterol inhaler proper use demonstrated    Xyzal for antihistamine    Follow-up in 2 to 4 weeks if no improvement

## 2025-01-17 ENCOUNTER — OFFICE VISIT (OUTPATIENT)
Dept: FAMILY MEDICINE CLINIC | Facility: CLINIC | Age: 45
End: 2025-01-17
Payer: COMMERCIAL

## 2025-01-17 VITALS
BODY MASS INDEX: 31.61 KG/M2 | HEIGHT: 70 IN | SYSTOLIC BLOOD PRESSURE: 132 MMHG | DIASTOLIC BLOOD PRESSURE: 81 MMHG | HEART RATE: 57 BPM | WEIGHT: 220.81 LBS

## 2025-01-17 DIAGNOSIS — M75.22 BICIPITAL TENDINITIS OF LEFT SHOULDER: Primary | ICD-10-CM

## 2025-01-17 PROBLEM — R00.2 PALPITATIONS: Status: ACTIVE | Noted: 2024-05-21

## 2025-01-17 PROBLEM — I49.3 PVC'S (PREMATURE VENTRICULAR CONTRACTIONS): Status: ACTIVE | Noted: 2024-05-21

## 2025-01-17 PROBLEM — R00.1 BRADYCARDIA: Status: ACTIVE | Noted: 2024-05-21

## 2025-01-17 PROCEDURE — 3008F BODY MASS INDEX DOCD: CPT | Performed by: FAMILY MEDICINE

## 2025-01-17 PROCEDURE — 99213 OFFICE O/P EST LOW 20 MIN: CPT | Performed by: FAMILY MEDICINE

## 2025-01-17 PROCEDURE — 3079F DIAST BP 80-89 MM HG: CPT | Performed by: FAMILY MEDICINE

## 2025-01-17 PROCEDURE — 3075F SYST BP GE 130 - 139MM HG: CPT | Performed by: FAMILY MEDICINE

## 2025-01-17 RX ORDER — MELOXICAM 15 MG/1
15 TABLET ORAL DAILY
Qty: 30 TABLET | Refills: 1 | Status: SHIPPED | OUTPATIENT
Start: 2025-01-17 | End: 2025-03-18

## 2025-01-17 NOTE — PROGRESS NOTES
Blood pressure 132/81, pulse 57, height 5' 10\" (1.778 m), weight 220 lb 12.8 oz (100.2 kg).      Over a month of left shoulder left biceps pain.  No traumatic injuries.  No dislocation.  Minimal neck discomfort.    Exercises with weights regularly but not for the past 3 to 4 weeks.  He is right-handed  Objective    Tenderness noted left bicipital groove  Left shoulder exam  Negative David sign negative Hawkin's test good joint stability negative apprehension test of the left shoulder    Negative Spurling test bilaterally     left shoulder with good range of motion    Negative bucket-handle test left shoulder    Assessment left bicipital tendinitis    Plan meloxicam rest shoulder referral entered to orthopedics

## 2025-03-20 ENCOUNTER — TELEPHONE (OUTPATIENT)
Facility: CLINIC | Age: 45
End: 2025-03-20

## 2025-03-20 DIAGNOSIS — M25.512 LEFT SHOULDER PAIN, UNSPECIFIED CHRONICITY: Primary | ICD-10-CM

## 2025-03-20 NOTE — TELEPHONE ENCOUNTER
XR ordered per ortho protocol. XR scheduled and patient was notified via [employer] to let them know that they should arrive 15-20 minutes early, in order for them to complete imaging.

## 2025-03-20 NOTE — TELEPHONE ENCOUNTER
Patient called to schedule for Bicipital tendinitis of left shoulder.   Please advise if imaging is needed.   Future Appointments   Date Time Provider Department Center   4/9/2025  8:00 AM [id number]

## 2025-04-02 ENCOUNTER — HOSPITAL ENCOUNTER (OUTPATIENT)
Dept: MRI IMAGING | Facility: HOSPITAL | Age: 45
Discharge: HOME | End: 2025-04-02
Attending: PHYSICIAN ASSISTANT
Payer: COMMERCIAL

## 2025-04-02 ENCOUNTER — HOSPITAL ENCOUNTER (OUTPATIENT)
Dept: GENERAL RADIOLOGY | Facility: HOSPITAL | Age: 45
Discharge: HOME | End: 2025-04-02
Attending: PHYSICIAN ASSISTANT
Payer: COMMERCIAL

## 2025-04-02 ENCOUNTER — HOSPITAL ENCOUNTER (OUTPATIENT)
Dept: GENERAL RADIOLOGY | Age: 45
Discharge: HOME | End: 2025-04-02
Attending: ORTHOPAEDIC SURGERY
Payer: COMMERCIAL

## 2025-04-02 ENCOUNTER — OFFICE VISIT (OUTPATIENT)
Dept: ORTHOPEDICS CLINIC | Facility: CLINIC | Age: 45
End: 2025-04-02
Payer: COMMERCIAL

## 2025-04-02 VITALS — HEIGHT: 70 IN | BODY MASS INDEX: 31.5 KG/M2 | WEIGHT: 220 LBS

## 2025-04-02 DIAGNOSIS — M19.012 ARTHROSIS OF LEFT ACROMIOCLAVICULAR JOINT: ICD-10-CM

## 2025-04-02 DIAGNOSIS — M75.22 BICEPS TENDINITIS OF LEFT SHOULDER: ICD-10-CM

## 2025-04-02 DIAGNOSIS — M75.42 IMPINGEMENT SYNDROME OF LEFT SHOULDER: ICD-10-CM

## 2025-04-02 DIAGNOSIS — M25.512 LEFT SHOULDER PAIN, UNSPECIFIED CHRONICITY: ICD-10-CM

## 2025-04-02 DIAGNOSIS — M75.42 IMPINGEMENT SYNDROME OF LEFT SHOULDER: Primary | ICD-10-CM

## 2025-04-02 PROCEDURE — 73221 MRI JOINT UPR EXTREM W/O DYE: CPT | Performed by: PHYSICIAN ASSISTANT

## 2025-04-02 PROCEDURE — 73590 X-RAY EXAM OF LOWER LEG: CPT | Performed by: PHYSICIAN ASSISTANT

## 2025-04-02 PROCEDURE — 73030 X-RAY EXAM OF SHOULDER: CPT | Performed by: ORTHOPAEDIC SURGERY

## 2025-04-02 PROCEDURE — 3008F BODY MASS INDEX DOCD: CPT | Performed by: PHYSICIAN ASSISTANT

## 2025-04-02 PROCEDURE — 99204 OFFICE O/P NEW MOD 45 MIN: CPT | Performed by: PHYSICIAN ASSISTANT

## 2025-04-02 NOTE — PROGRESS NOTES
[facility] New Patient Note    CC: Left shoulder pain    HPI: This 44 year old male presents today with complaints of left shoulder pain.  Onset of symptoms started approximately 6 months ago with no acute injury.  He relates progressive pain in the left shoulder that is localized to the anterior lateral aspect of the shoulder with occasional radiation into the upper arm.  He was recently seen and evaluated by Dr. Ayon who prescribed meloxicam and advised orthopedic evaluation.  He is a very active weightlifter and has modified his activity significantly and taken oral anti-inflammatory with temporary improvement in symptoms.  He does note catching in the shoulder as well as pain with movement away from his body or overhead.  He does have a history of a distal biceps tendon repair about 8 years ago.  Occasionally he will experience weakness.  He is here for further evaluation.    No past medical history on file.  Past Surgical History:   Procedure Laterality Date    Knee surgery      left knee ACL/MCL/MENISCUS    Repair biceps long tendon      Repair of nasal septum       No current outpatient medications on file.     Allergies[1]  Family History   Family history unknown: Yes     Social History     Occupational History    Not on file   Tobacco Use    Smoking status: Never    Smokeless tobacco: Never   Vaping Use    Vaping status: Never Used   Substance and Sexual Activity    Alcohol use: Yes     Alcohol/week: 10.0 standard drinks of alcohol     Types: 10 Standard drinks or equivalent per week     Comment: socially    Drug use: Never    Sexual activity: Not on file        ROS:  Complete review of symptoms was reviewed and is non-contributory to the chief complaint as mentioned above.      Physical Exam: He is a pleasant 44-year-old male in no acute distress.  Exam of the left shoulder and upper extremity reveals that he is nontender to palpation at the AC joint or lateral acromion.  No tenderness at the  bicipital groove.  He has full symmetric active elevation with pain through the impingement zone.  He has symmetric external rotation to 70 degrees with discomfort.  No pain with internal rotation to the lower thoracic spine.  He has discomfort but no significant weakness with resisted external rotation and empty can testing.  No pain or weakness with belly liftoff testing.  He does have a positive speeds and Powhatan's test.  Positive impingement sign.  Sensation is present to light touch.        Imaging: I personally viewed, independently interpreted and radiology report read.  They show  XR TIBIA + FIBULA (2 VIEWS), LEFT (CPT=73590)    Result Date: 4/2/2025  CONCLUSION:   1. Multiple tiny radiopaque foci at the lateral aspect of the distal thigh/superior knee which may be related to patient's previous surgery.  It is unclear if these represent tiny calcifications or tiny metallic fragments.  2. Radiolucent diagonally oriented tunnels within the knee compatible with previous cruciate ligament repair.     Dictated by (CST): Kartik Buenrostro MD on 4/02/2025 at 11:04 AM     Finalized by (CST): Kartik Buenrostro MD on 4/02/2025 at 11:07 AM          XR SHOULDER, COMPLETE (MIN 2 VIEWS), LEFT (CPT=73030)    Result Date: 4/2/2025  CONCLUSION:  Mild osteoarthritic changes are present. No acute fracture or other acute bony process.   LOCATION:  [facility]   Dictated by (CST): Lucio Graff MD on 4/02/2025 at 8:20 AM     Finalized by (CST): Lucio Graff MD on 4/02/2025 at 8:20 AM            Assessment: Left shoulder impingement syndrome, AC joint arthrosis and biceps tendinitis      Plan: I reviewed x-ray and exam findings with the patient today as well as previous evaluation notes from Dr. Ayon office.  Pain is localized to the anterior aspect of the shoulder and is consistent with biceps tendinitis as well as a possible labral tear with a large subacromial spur.  Due to the chronic nature of his symptoms as well as no  improvement with conservative physician guided treatment including oral anti-inflammatories, exercises on his own and modifying his activities, it would be reasonable to go ahead with an MRI scan of the left shoulder.  We will hold off on any cortisone injections for the time being.  He will continue with conservative treatment in the interim including oral anti-inflammatories as needed, avoiding lifting or activity away from his body or overhead.  He will follow-up with me once the MRI is available to review to discuss next steps in treatment.  He will follow-up as mentioned or sooner with questions, concerns or worsening symptoms in the interim.        Darling Park PA-C  Orthopedic Surgery   [address] [place] [id number]   t: [phone]  f: [phone]           This document was partially prepared using [employer] voice recognition software.  Although every attempt is made to correct errors during dictation, discrepancies may still exist. Please contact me with any questions or clarifications.         [1]   Allergies  Allergen Reactions    Penicillins OTHER (SEE COMMENTS)     UNKNOWN  UNKNOWN

## (undated) DEVICE — CO2 CANNULA,SSOFT,ADLT,7O2,4CO2,FEMALE: Brand: MEDLINE

## (undated) DEVICE — GIJAW SINGLE-USE BIOPSY FORCEPS WITH NEEDLE: Brand: GIJAW

## (undated) DEVICE — CONMED SCOPE SAVER BITE BLOCK, 20X27 MM: Brand: SCOPE SAVER

## (undated) DEVICE — V2 SPECIMEN COLLECTION MANIFOLD KIT: Brand: NEPTUNE

## (undated) DEVICE — YANKAUER,BULB TIP,W/O VENT,RIGID,STERILE: Brand: MEDLINE

## (undated) DEVICE — KIT ENDO ORCAPOD 160/180/190

## (undated) DEVICE — MEDI-VAC NON-CONDUCTIVE SUCTION TUBING: Brand: CARDINAL HEALTH

## (undated) DEVICE — SYRINGE, LUER SLIP, STERILE, 60ML: Brand: MEDLINE

## (undated) DEVICE — KIT CLEAN ENDOKIT 1.1OZ GOWNX2

## (undated) DEVICE — MEDI-VAC NON-CONDUCTIVE SUCTION TUBING 6MM X 1.8M (6FT.) L: Brand: CARDINAL HEALTH

## (undated) NOTE — LETTER
[facility]  [address], [place], [place]    Authorization for Surgical Operation and Procedure                               I hereby authorize Jesus Maurice MD, my physician and his/her assistants (if applicable), which may include medical students, residents, and/or fellows, to perform the following surgical operation/ procedure and administer such anesthesia as may be determined necessary by my physician: Operation/Procedure name (s) ESOPHAGOGASTRODUODENOSCOPY on Wan Melendrez   2.   I recognize that during the surgical operation/procedure, unforeseen conditions may necessitate additional or different procedures than those listed above.  I, therefore, further authorize and request that the above-named surgeon, assistants, or designees perform such procedures as are, in their judgment, necessary and desirable.    3.   My surgeon/physician has discussed prior to my surgery the potential benefits, risks and side effects of this procedure; the likelihood of achieving goals; and potential problems that might occur during recuperation.  They also discussed reasonable alternatives to the procedure, including risks, benefits, and side effects related to the alternatives and risks related to not receiving this procedure.  I have had all my questions answered and I acknowledge that no guarantee has been made as to the result that may be obtained.    4.   Should the need arise during my operation/procedure, which includes change of level of care prior to discharge, I also consent to the administration of blood and/or blood products.  Further, I understand that despite careful testing and screening of blood or blood products by collecting agencies, I may still be subject to ill effects as a result of receiving a blood transfusion and/or blood products.  The following are some, but not all, of the potential risks that can occur: fever and allergic reactions, hemolytic reactions, transmission  of diseases such as Hepatitis, AIDS and Cytomegalovirus (CMV) and fluid overload.  In the event that I wish to have an autologous transfusion of my own blood, or a directed donor transfusion, I will discuss this with my physician.  Check only if Refusing Blood or Blood Products  I understand refusal of blood or blood products as deemed necessary by my physician may have serious consequences to my condition to include possible death. I hereby assume responsibility for my refusal and release the hospital, its personnel, and my physicians from any responsibility for the consequences of my refusal.    o  Refuse   5.   I authorize the use of any specimen, organs, tissues, body parts or foreign objects that may be removed from my body during the operation/procedure for diagnosis, research or teaching purposes and their subsequent disposal by hospital authorities.  I also authorize the release of specimen test results and/or written reports to my treating physician on the hospital medical staff or other referring or consulting physicians involved in my care, at the discretion of the Pathologist or my treating physician.    6.   I consent to the photographing or videotaping of the operations or procedures to be performed, including appropriate portions of my body for medical, scientific, or educational purposes, provided my identity is not revealed by the pictures or by descriptive texts accompanying them.  If the procedure has been photographed/videotaped, the surgeon will obtain the original picture, image, videotape or CD.  The hospital will not be responsible for storage, release or maintenance of the picture, image, tape or CD.    7.   I consent to the presence of a  or observers in the operating room as deemed necessary by my physician or their designees.    8.   I recognize that in the event my procedure results in extended X-Ray/fluoroscopy time, I may develop a skin reaction.    9. If I have a Do  Not Attempt Resuscitation (DNAR) order in place, that status will be suspended while in the operating room, procedural suite, and during the recovery period unless otherwise explicitly stated by me (or a person authorized to consent on my behalf). The surgeon or my attending physician will determine when the applicable recovery period ends for purposes of reinstating the DNAR order.  10. Patients having a sterilization procedure: I understand that if the procedure is successful the results will be permanent and it will therefore be impossible for me to inseminate, conceive, or bear children.  I also understand that the procedure is intended to result in sterility, although the result has not been guaranteed.   11. I acknowledge that my physician has explained sedation/analgesia administration to me including the risk and benefits I consent to the administration of sedation/analgesia as may be necessary or desirable in the judgment of my physician.    I CERTIFY THAT I HAVE READ AND FULLY UNDERSTAND THE ABOVE CONSENT TO OPERATION and/or OTHER PROCEDURE.     ____________________________________  _________________________________        ______________________________  Signature of Patient    Signature of Responsible Person                Printed Name of Responsible Person                                      ____________________________________  _____________________________                ________________________________  Signature of Witness        Date  Time         Relationship to Patient    STATEMENT OF PHYSICIAN My signature below affirms that prior to the time of the procedure; I have explained to the patient and/or his/her legal representative, the risks and benefits involved in the proposed treatment and any reasonable alternative to the proposed treatment. I have also explained the risks and benefits involved in refusal of the proposed treatment and alternatives to the proposed treatment and have answered the  patient's questions. If I have a significant financial interest in a co-management agreement or a significant financial interest in any product or implant, or other significant relationship used in this procedure/surgery, I have disclosed this and had a discussion with my patient.     _____________________________________________________              _____________________________  (Signature of Physician)                                                                                         (Date)                                   (Time)  Patient Name: Wan Melendrez      : 1980      Printed: 10/1/2024     Medical Record #: [id number]                                      Page 1 of 1

## (undated) NOTE — LETTER
[facility]  Administration of Anesthesia  I, Wan Melendrez agree to be cared for by a physician anesthesiologist alone and/or with a nurse anesthetist, who is specially trained to monitor me and give me medicine to put me to sleep or keep me comfortable during my procedure    I understand that my anesthesiologist and/or anesthetist is not an employee or agent of [facility] or [employer]. He or she works for [facility]    As the patient asking for anesthesia services, I agree to:  Allow the anesthesiologist (anesthesia doctor) to give me medicine and do additional procedures as necessary. Some examples are: Starting or using an “IV” to give me medicine, fluids or blood during my procedure, and having a breathing tube placed to help me breathe when I’m asleep (intubation). In the event that my heart stops working properly, I understand that my anesthesiologist will make every effort to sustain my life, unless otherwise directed by [facility] Do Not Resuscitate documents.  Tell my anesthesia doctor before my procedure:  If I am pregnant.  The last time that I ate or drank.  iii. All of the medicines I take (including prescriptions, herbal supplements, and pills I can buy without a prescription (including street drugs/illegal medications). Failure to inform my anesthesiologist about these medicines may increase my risk of anesthetic complications.  iv.If I am allergic to anything or have had a reaction to anesthesia before.  I understand how the anesthesia medicine will help me (benefits).  I understand that with any type of anesthesia medicine there are risks:  The most common risks are: nausea, vomiting, sore throat, muscle soreness, damage to my eyes, mouth, or teeth (from breathing tube placement).  Rare risks include: remembering what happened during my procedure, allergic reactions to medications, injury to my airway, heart, lungs, vision, nerves, or  muscles and in extremely rare instances death.  My doctor has explained to me other choices available to me for my care (alternatives).  Pregnant Patients (“epidural”):  I understand that the risks of having an epidural (medicine given into my back to help control pain during labor), include itching, low blood pressure, difficulty urinating, headache or slowing of the baby’s heart. Very rare risks include infection, bleeding, seizure, irregular heart rhythms and nerve injury.  Regional Anesthesia (“spinal”, “epidural”, & “nerve blocks”):  I understand that rare but potential complications include headache, bleeding, infection, seizure, irregular heart rhythms, and nerve injury.    _____________________________________________________________________________  Patient (or Representative) Signature/Relationship to Patient  Date   Time    _____________________________________________________________________________   Name (if used)    Language/Organization   Time    _____________________________________________________________________________  Nurse Anesthetist Signature     Date   Time  _____________________________________________________________________________  Anesthesiologist Signature     Date   Time  I have discussed the procedure and information above with the patient (or patient’s representative) and answered their questions. The patient or their representative has agreed to have anesthesia services.    _____________________________________________________________________________  Witness        Date   Time  I have verified that the signature is that of the patient or patient’s representative, and that it was signed before the procedure  Patient Name: Wan Melendrez     : 1980                 Printed: 10/1/2024 at 11:07 AM    Medical Record #: [id number]                                            Page 1 of 1  ----------ANESTHESIA CONSENT----------